# Patient Record
Sex: FEMALE | Race: WHITE | NOT HISPANIC OR LATINO | Employment: OTHER | URBAN - NONMETROPOLITAN AREA
[De-identification: names, ages, dates, MRNs, and addresses within clinical notes are randomized per-mention and may not be internally consistent; named-entity substitution may affect disease eponyms.]

---

## 2017-11-20 ENCOUNTER — PROCEDURE VISIT (OUTPATIENT)
Dept: OBSTETRICS AND GYNECOLOGY | Facility: CLINIC | Age: 57
End: 2017-11-20

## 2017-11-20 VITALS
BODY MASS INDEX: 24.86 KG/M2 | WEIGHT: 164 LBS | DIASTOLIC BLOOD PRESSURE: 89 MMHG | SYSTOLIC BLOOD PRESSURE: 141 MMHG | HEIGHT: 68 IN

## 2017-11-20 DIAGNOSIS — N95.1 MENOPAUSAL STATE: ICD-10-CM

## 2017-11-20 DIAGNOSIS — Z12.31 ENCOUNTER FOR SCREENING MAMMOGRAM FOR BREAST CANCER: ICD-10-CM

## 2017-11-20 DIAGNOSIS — Z01.419 ENCOUNTER FOR GYNECOLOGICAL EXAMINATION WITHOUT ABNORMAL FINDING: Primary | ICD-10-CM

## 2017-11-20 DIAGNOSIS — Z90.710 H/O HYSTERECTOMY FOR BENIGN DISEASE: ICD-10-CM

## 2017-11-20 PROCEDURE — 99396 PREV VISIT EST AGE 40-64: CPT | Performed by: NURSE PRACTITIONER

## 2017-11-20 RX ORDER — UBIDECARENONE 75 MG
50 CAPSULE ORAL DAILY
COMMUNITY

## 2017-11-20 NOTE — PROGRESS NOTES
Subjective   Jessica Allison is a 57 y.o.  here for annual exam and mammogram. No complaints at this time.    Gynecologic Exam   The patient's pertinent negatives include no genital itching, genital lesions, genital odor, genital rash, pelvic pain, vaginal bleeding or vaginal discharge. Pertinent negatives include no abdominal pain, dysuria, headaches, nausea, rash or vomiting. She is sexually active. No, her partner does not have an STD. She uses hysterectomy for contraception. She is postmenopausal.     Hx of RSO in  for ovarian torsion and TLH/LSO in  for symptomatic fibroids.  Last pap- 10/12/10 normal  Last mammo- 10/18/16 normal    The following portions of the patient's history were reviewed and updated as appropriate: allergies, current medications, past family history, past medical history, past social history, past surgical history and problem list.    Review of Systems   Constitutional: Negative for activity change, appetite change, fatigue and unexpected weight change.   Respiratory: Negative for chest tightness and shortness of breath.    Cardiovascular: Negative for chest pain, palpitations and leg swelling.   Gastrointestinal: Negative for abdominal distention, abdominal pain, nausea and vomiting.   Genitourinary: Negative for difficulty urinating, dyspareunia, dysuria, pelvic pain, vaginal bleeding, vaginal discharge and vaginal pain.   Musculoskeletal: Negative for gait problem and myalgias.   Skin: Negative for color change, pallor and rash.   Neurological: Negative for dizziness, weakness, light-headedness and headaches.   Hematological: Negative for adenopathy.   Psychiatric/Behavioral: Negative for agitation, dysphoric mood and sleep disturbance. The patient is not nervous/anxious.        Objective   Physical Exam   Constitutional: She is oriented to person, place, and time. She appears well-developed and well-nourished.   Cardiovascular: Normal rate, regular rhythm, normal  heart sounds and intact distal pulses.    Pulmonary/Chest: Effort normal and breath sounds normal. Right breast exhibits no inverted nipple, no mass, no nipple discharge, no skin change and no tenderness. Left breast exhibits no inverted nipple, no mass, no nipple discharge, no skin change and no tenderness. Breasts are symmetrical. There is no breast swelling.   Genitourinary: No breast tenderness. There is no rash, tenderness, lesion or injury on the right labia. There is no rash, tenderness, lesion or injury on the left labia. There is erythema in the vagina. No tenderness or bleeding in the vagina. No foreign body in the vagina. No signs of injury around the vagina. No vaginal discharge found.   Genitourinary Comments: Cervix, uterus and adnexa absent. Postmenopausal vaginal atrophy noted. Pap smear not indicated.   Lymphadenopathy:     She has no axillary adenopathy.        Right: No inguinal adenopathy present.        Left: No inguinal adenopathy present.   Neurological: She is alert and oriented to person, place, and time.   Skin: Skin is warm, dry and intact.   Psychiatric: She has a normal mood and affect. Her behavior is normal.   Nursing note and vitals reviewed.        Assessment/Plan   Jessica was seen today for gynecologic exam.    Diagnoses and all orders for this visit:    Encounter for gynecological examination without abnormal finding    Encounter for screening mammogram for breast cancer  -     Mammo Screening Digital Tomosynthesis Bilateral With CAD    H/O hysterectomy for benign disease    Menopausal state

## 2018-10-22 DIAGNOSIS — Z12.39 SCREENING FOR MALIGNANT NEOPLASM OF BREAST: Primary | ICD-10-CM

## 2019-11-15 RX ORDER — LEVOTHYROXINE SODIUM 0.03 MG/1
25 TABLET ORAL DAILY
COMMUNITY

## 2019-11-20 ENCOUNTER — HOSPITAL ENCOUNTER (OUTPATIENT)
Facility: HOSPITAL | Age: 59
Setting detail: HOSPITAL OUTPATIENT SURGERY
Discharge: HOME | End: 2019-11-20
Attending: INTERNAL MEDICINE | Admitting: INTERNAL MEDICINE

## 2019-11-20 ENCOUNTER — ANESTHESIA EVENT (OUTPATIENT)
Dept: GASTROENTEROLOGY | Facility: HOSPITAL | Age: 59
End: 2019-11-20

## 2019-11-20 ENCOUNTER — ANESTHESIA (OUTPATIENT)
Dept: GASTROENTEROLOGY | Facility: HOSPITAL | Age: 59
End: 2019-11-20

## 2019-11-20 VITALS
RESPIRATION RATE: 18 BRPM | HEART RATE: 71 BPM | HEIGHT: 69 IN | DIASTOLIC BLOOD PRESSURE: 71 MMHG | TEMPERATURE: 98 F | SYSTOLIC BLOOD PRESSURE: 118 MMHG | BODY MASS INDEX: 23.55 KG/M2 | OXYGEN SATURATION: 93 % | WEIGHT: 159 LBS

## 2019-11-20 DIAGNOSIS — Z12.11 SCREEN FOR COLON CANCER: ICD-10-CM

## 2019-11-20 PROCEDURE — 88305 TISSUE EXAM BY PATHOLOGIST: CPT | Performed by: PATHOLOGY

## 2019-11-20 PROCEDURE — 25010000002 PROPOFOL 10 MG/ML EMULSION: Performed by: NURSE ANESTHETIST, CERTIFIED REGISTERED

## 2019-11-20 PROCEDURE — 88305 TISSUE EXAM BY PATHOLOGIST: CPT | Performed by: INTERNAL MEDICINE

## 2019-11-20 RX ORDER — DEXTROSE AND SODIUM CHLORIDE 5; .45 G/100ML; G/100ML
30 INJECTION, SOLUTION INTRAVENOUS CONTINUOUS PRN
Status: DISCONTINUED | OUTPATIENT
Start: 2019-11-20 | End: 2019-11-20 | Stop reason: HOSPADM

## 2019-11-20 RX ORDER — PROPOFOL 10 MG/ML
VIAL (ML) INTRAVENOUS AS NEEDED
Status: DISCONTINUED | OUTPATIENT
Start: 2019-11-20 | End: 2019-11-20 | Stop reason: SURG

## 2019-11-20 RX ORDER — LIDOCAINE HYDROCHLORIDE 20 MG/ML
INJECTION, SOLUTION EPIDURAL; INFILTRATION; INTRACAUDAL; PERINEURAL AS NEEDED
Status: DISCONTINUED | OUTPATIENT
Start: 2019-11-20 | End: 2019-11-20 | Stop reason: SURG

## 2019-11-20 RX ADMIN — PROPOFOL 20 MG: 10 INJECTION, EMULSION INTRAVENOUS at 13:43

## 2019-11-20 RX ADMIN — PROPOFOL 30 MG: 10 INJECTION, EMULSION INTRAVENOUS at 13:39

## 2019-11-20 RX ADMIN — PROPOFOL 30 MG: 10 INJECTION, EMULSION INTRAVENOUS at 13:45

## 2019-11-20 RX ADMIN — PROPOFOL 20 MG: 10 INJECTION, EMULSION INTRAVENOUS at 13:47

## 2019-11-20 RX ADMIN — PROPOFOL 20 MG: 10 INJECTION, EMULSION INTRAVENOUS at 13:40

## 2019-11-20 RX ADMIN — LIDOCAINE HYDROCHLORIDE 100 MG: 20 INJECTION, SOLUTION EPIDURAL; INFILTRATION; INTRACAUDAL at 13:35

## 2019-11-20 RX ADMIN — PROPOFOL 20 MG: 10 INJECTION, EMULSION INTRAVENOUS at 13:37

## 2019-11-20 RX ADMIN — DEXTROSE AND SODIUM CHLORIDE 30 ML/HR: 5; 450 INJECTION, SOLUTION INTRAVENOUS at 12:58

## 2019-11-20 RX ADMIN — PROPOFOL 30 MG: 10 INJECTION, EMULSION INTRAVENOUS at 13:38

## 2019-11-20 RX ADMIN — PROPOFOL 30 MG: 10 INJECTION, EMULSION INTRAVENOUS at 13:42

## 2019-11-20 RX ADMIN — PROPOFOL 100 MG: 10 INJECTION, EMULSION INTRAVENOUS at 13:35

## 2019-11-20 NOTE — ANESTHESIA POSTPROCEDURE EVALUATION
Patient: Jessica Allison    Procedure Summary     Date:  11/20/19 Room / Location:  [facility] ENDOSCOPY 2 / [facility]    Anesthesia Start:  1333 Anesthesia Stop:  1354    Procedure:  COLONOSCOPY (N/A ) Diagnosis:       Screen for colon cancer      (Screen for colon cancer [Z12.11])    Surgeon:  Jono Palafox DO Provider:  Sosa Harvey CRNA    Anesthesia Type:  MAC ASA Status:  2          Anesthesia Type: MAC  Last vitals  BP       Temp   98.3 °F (36.8 °C) (11/20/19 1245)   Pulse   81 (11/20/19 1242)   Resp   18 (11/20/19 1242)     SpO2   100 % (11/20/19 1242)     Post Anesthesia Care and Evaluation    Patient location during evaluation: bedside  Patient participation: waiting for patient participation  Level of consciousness: sleepy but conscious  Pain score: 0  Pain management: adequate  Airway patency: patent  Anesthetic complications: No anesthetic complications  PONV Status: none  Cardiovascular status: acceptable  Respiratory status: acceptable  Hydration status: acceptable

## 2019-11-20 NOTE — ANESTHESIA PREPROCEDURE EVALUATION
Anesthesia Evaluation     Patient summary reviewed and Nursing notes reviewed                Airway   Mallampati: II  TM distance: >3 FB  Neck ROM: full  No difficulty expected  Dental - normal exam     Pulmonary - negative pulmonary ROS and normal exam   Cardiovascular - normal exam    (+) hypertension well controlled less than 2 medications,       Neuro/Psych- negative ROS  GI/Hepatic/Renal/Endo    (+)   thyroid problem hypothyroidism    Musculoskeletal (-) negative ROS    Abdominal  - normal exam   Substance History - negative use     OB/GYN negative ob/gyn ROS         Other                        Anesthesia Plan    ASA 2     MAC     intravenous induction     Anesthetic plan, all risks, benefits, and alternatives have been provided, discussed and informed consent has been obtained with: patient.

## 2019-11-21 LAB
LAB AP CASE REPORT: NORMAL
PATH REPORT.FINAL DX SPEC: NORMAL
PATH REPORT.GROSS SPEC: NORMAL

## 2019-12-17 ENCOUNTER — PROCEDURE VISIT (OUTPATIENT)
Dept: OBSTETRICS AND GYNECOLOGY | Facility: CLINIC | Age: 59
End: 2019-12-17

## 2019-12-17 VITALS
SYSTOLIC BLOOD PRESSURE: 110 MMHG | DIASTOLIC BLOOD PRESSURE: 70 MMHG | BODY MASS INDEX: 23.85 KG/M2 | HEIGHT: 69 IN | WEIGHT: 161 LBS

## 2019-12-17 DIAGNOSIS — Z78.0 OSTEOPENIA AFTER MENOPAUSE: ICD-10-CM

## 2019-12-17 DIAGNOSIS — Z01.419 ENCOUNTER FOR GYNECOLOGICAL EXAMINATION WITHOUT ABNORMAL FINDING: Primary | ICD-10-CM

## 2019-12-17 DIAGNOSIS — Z12.31 ENCOUNTER FOR SCREENING MAMMOGRAM FOR MALIGNANT NEOPLASM OF BREAST: ICD-10-CM

## 2019-12-17 DIAGNOSIS — M85.80 OSTEOPENIA AFTER MENOPAUSE: ICD-10-CM

## 2019-12-17 PROCEDURE — 99396 PREV VISIT EST AGE 40-64: CPT | Performed by: NURSE PRACTITIONER

## 2019-12-17 NOTE — PROGRESS NOTES
Subjective   Jessica Allison is a 59 y.o. here for GYN Exam and mammogram. No concerns at this time.     Hx of TLH/LSO in 2005 for symptomatic fibroids and RSO in 1980 for ovarian torsion.  Last pap- 10/12/10 NIL; no hx of abnormal  Last mammo- 11/26/18 normal    Gynecologic Exam   The patient's pertinent negatives include no genital itching, genital lesions, genital odor, genital rash, pelvic pain, vaginal bleeding or vaginal discharge. Pertinent negatives include no abdominal pain, constipation, diarrhea or dysuria. She is sexually active. No, her partner does not have an STD. She uses hysterectomy for contraception. She is postmenopausal.       The following portions of the patient's history were reviewed and updated as appropriate: allergies, current medications, past family history, past medical history, past social history, past surgical history and problem list.    Review of Systems   Constitutional: Negative for activity change, appetite change, diaphoresis, fatigue, unexpected weight gain and unexpected weight loss.   Respiratory: Negative for chest tightness and shortness of breath.    Cardiovascular: Negative for chest pain and palpitations.   Gastrointestinal: Negative for abdominal distention, abdominal pain, constipation and diarrhea.   Endocrine: Negative.    Genitourinary: Negative for breast discharge, breast lump, breast pain, dyspareunia, dysuria, pelvic pain, pelvic pressure, urinary incontinence, vaginal bleeding, vaginal discharge and vaginal pain.   Musculoskeletal: Negative for myalgias.   Skin: Negative for color change, dry skin and skin lesions.   Neurological: Negative for light-headedness and headache.   Psychiatric/Behavioral: Negative for agitation, dysphoric mood, sleep disturbance, depressed mood and stress. The patient is not nervous/anxious.        Objective   Physical Exam   Constitutional: She is oriented to person, place, and time. She appears well-developed and  well-nourished. No distress.   Neck: No thyroid mass and no thyromegaly present.   Cardiovascular: Normal rate, regular rhythm, normal heart sounds and intact distal pulses.   Pulmonary/Chest: Effort normal and breath sounds normal. Right breast exhibits no inverted nipple, no mass, no nipple discharge, no skin change and no tenderness. Left breast exhibits no inverted nipple, no mass, no nipple discharge, no skin change and no tenderness. No breast swelling or bleeding. Breasts are symmetrical.   Abdominal: Soft. Bowel sounds are normal. She exhibits no distension. There is no tenderness.   Genitourinary: No breast swelling or bleeding.   Genitourinary Comments: Uterus, cervix and adnexa absent. Well healed vaginal cuff. Pap not indicated.   Lymphadenopathy:     She has no axillary adenopathy.        Right: No inguinal adenopathy present.        Left: No inguinal adenopathy present.   Neurological: She is alert and oriented to person, place, and time.   Skin: Skin is warm, dry and intact. She is not diaphoretic.   Psychiatric: She has a normal mood and affect. Her behavior is normal.   Nursing note and vitals reviewed.        Assessment/Plan   Jessica was seen today for gynecologic exam.    Diagnoses and all orders for this visit:    Encounter for gynecological examination without abnormal finding    Encounter for screening mammogram for malignant neoplasm of breast  -     Mammo Screening Digital Tomosynthesis Bilateral With CAD    Osteopenia after menopause  -     DEXA Bone Density Axial; Future    Mammogram and DEXA today. F/U in 1 year for pelvic exam or sooner, PRN.

## 2019-12-18 ENCOUNTER — TELEPHONE (OUTPATIENT)
Dept: OBSTETRICS AND GYNECOLOGY | Facility: CLINIC | Age: 59
End: 2019-12-18

## 2019-12-18 NOTE — TELEPHONE ENCOUNTER
----- Message from GARRET Marquez sent at 12/18/2019  8:55 AM CST -----  Still has osteopenia but that has improved since the last scan in 2015. Continue calcium and vitamin D supplementation as well as exercise. Next due in 2 years.

## 2020-11-05 ENCOUNTER — TRANSCRIBE ORDERS (OUTPATIENT)
Dept: PHYSICAL THERAPY | Facility: CLINIC | Age: 60
End: 2020-11-05

## 2020-11-05 DIAGNOSIS — G89.29 CHRONIC RIGHT SHOULDER PAIN: ICD-10-CM

## 2020-11-05 DIAGNOSIS — M50.30 DEGENERATION OF INTERVERTEBRAL DISC OF CERVICAL REGION: Primary | ICD-10-CM

## 2020-11-05 DIAGNOSIS — M25.511 CHRONIC RIGHT SHOULDER PAIN: ICD-10-CM

## 2020-11-09 ENCOUNTER — TREATMENT (OUTPATIENT)
Dept: PHYSICAL THERAPY | Facility: CLINIC | Age: 60
End: 2020-11-09

## 2020-11-09 DIAGNOSIS — M50.30 DDD (DEGENERATIVE DISC DISEASE), CERVICAL: Primary | ICD-10-CM

## 2020-11-09 DIAGNOSIS — M75.41 SHOULDER IMPINGEMENT, RIGHT: ICD-10-CM

## 2020-11-09 PROCEDURE — 97161 PT EVAL LOW COMPLEX 20 MIN: CPT | Performed by: PHYSICAL THERAPIST

## 2020-11-09 PROCEDURE — 97140 MANUAL THERAPY 1/> REGIONS: CPT | Performed by: PHYSICAL THERAPIST

## 2020-11-09 PROCEDURE — 97110 THERAPEUTIC EXERCISES: CPT | Performed by: PHYSICAL THERAPIST
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
## 2020-11-16 ENCOUNTER — TREATMENT (OUTPATIENT)
Dept: PHYSICAL THERAPY | Facility: CLINIC | Age: 60
End: 2020-11-16

## 2020-11-16 DIAGNOSIS — M75.41 SHOULDER IMPINGEMENT, RIGHT: ICD-10-CM

## 2020-11-16 DIAGNOSIS — M50.30 DDD (DEGENERATIVE DISC DISEASE), CERVICAL: Primary | ICD-10-CM

## 2020-11-16 PROCEDURE — 97110 THERAPEUTIC EXERCISES: CPT | Performed by: PHYSICAL THERAPIST

## 2020-11-16 PROCEDURE — 97140 MANUAL THERAPY 1/> REGIONS: CPT | Performed by: PHYSICAL THERAPIST

## 2020-11-16 NOTE — PROGRESS NOTES
Physical Therapy Daily Progress Note      Patient: Jessica Allison   : 1960  Referring practitioner: Deniz Peterson MD  Date of Initial Visit: Type: THERAPY  Noted: 2020  Today's Date: 2020  Patient seen for 2 sessions  Recert 12/ 3       Jessica Allison reports: Neck is hurting today, was improved following last visit  Subjective Questionnaire:       Subjective     Objective   See Exercise, Manual, and Modality Logs for complete treatment.   Rounded shoulder forward head slumped posture position at rest    Assessment/Plan  Plan Goals: 1.  Cervical flexion ROM  50   Degrees  2.  Cervical extension ROM  45 Degrees  3.  Cervical side bend 25 patricia  Degrees  4.  Cervical rotation left 78   Degrees.    LT. Independent in HEP.  2. Decrease NDI to 30%.  3. Maintain upper cervical alignment  4. Negative impigement sign  5. MMT shoulder flexion   Visit Diagnoses:    ICD-10-CM ICD-9-CM   1. DDD (degenerative disc disease), cervical  M50.30 722.4   2. Shoulder impingement, right  M75.41 726.2       Progress per Plan of Care and Progress strengthening /stabilization /functional activity           Timed:  Manual Therapy:   12      mins  50223;  Therapeutic Exercise:     35    mins  95003;     Neuromuscular Scotty:        mins  73863;    Therapeutic Activity:          mins  53438;     Gait Training:           mins  62893;     Ultrasound:          mins  51881;    Electrical Stimulation:         mins  15432 ( );    Untimed:  Electrical Stimulation:         mins  98317 ( );  Mechanical Traction:         mins  63327;     Timed Treatment:      mins   Total Treatment:     47   mins  Chacha Baldwin PT DPT  Physical Therapist

## 2020-11-24 ENCOUNTER — TREATMENT (OUTPATIENT)
Dept: PHYSICAL THERAPY | Facility: CLINIC | Age: 60
End: 2020-11-24

## 2020-11-24 DIAGNOSIS — M50.30 DDD (DEGENERATIVE DISC DISEASE), CERVICAL: Primary | ICD-10-CM

## 2020-11-24 DIAGNOSIS — M75.41 SHOULDER IMPINGEMENT, RIGHT: ICD-10-CM

## 2020-11-24 PROCEDURE — 97110 THERAPEUTIC EXERCISES: CPT | Performed by: PHYSICAL THERAPIST

## 2020-11-24 PROCEDURE — 97140 MANUAL THERAPY 1/> REGIONS: CPT | Performed by: PHYSICAL THERAPIST

## 2020-11-24 NOTE — PROGRESS NOTES
Physical Therapy Daily Progress Note      Patient: Jessica Allison   : 1960  Referring practitioner: Deniz Peterson MD  Date of Initial Visit: Type: THERAPY  Noted: 2020  Today's Date: 2020  Patient seen for 3 sessions    Recheck:   MD: MOR Allison reports:  No change yet        Subjective Evaluation    History of Present Illness    Subjective comment: pt states that the stretches and home exercises makes her neck very sore.Pain  Current pain ratin           Objective          Postural Observations    Additional Postural Observation Details  Good postural awareness throughout.      See Exercise, Manual, and Modality Logs for complete treatment.       Assessment & Plan     Assessment  Assessment details: Pt felt that she was overdoing it with HEP so showed her alternate/more gentle ways to stretch and pt responded well to that. Pt did feel better post manual to neck. Shoulder was tight with GH mobs. Inferior mobs bothered her neck some. Added no moneys to HEP and pt verbalized understanding of more gentle stretching at home.    Goals  Plan Goals: Plan Goals: 1.  Cervical flexion ROM  50   Degrees  2.  Cervical extension ROM  45 Degrees  3.  Cervical side bend 25 patricia  Degrees  4.  Cervical rotation left 78   Degrees.    LT. Independent in HEP.  2. Decrease NDI to 30%.  3. Maintain upper cervical alignment  4. Negative impigement sign  5. MMT shoulder flexion 5/5      Plan  Duration in visits: 12  Plan details: Cont manual. Try shoulder blade mobes next. Scap stab therex.        Visit Diagnoses:    ICD-10-CM ICD-9-CM   1. DDD (degenerative disc disease), cervical  M50.30 722.4   2. Shoulder impingement, right  M75.41 726.2       Progress per Plan of Care and Progress strengthening /stabilization /functional activity           Timed:  Manual Therapy:    15     mins  81075;  Therapeutic Exercise:    30     mins  25583;     Neuromuscular Scotty:        mins  35261;     Therapeutic Activity:          mins  50608;     Gait Training:           mins  44118;     Ultrasound:          mins  03252;    Electrical Stimulation:         mins  24087 ( );    Untimed:  Electrical Stimulation:         mins  36809 ( );  Mechanical Traction:         mins  06902;     Timed Treatment:   45   mins   Total Treatment:     45   mins  [facility]  Physical Therapist

## 2020-12-01 ENCOUNTER — TREATMENT (OUTPATIENT)
Dept: PHYSICAL THERAPY | Facility: CLINIC | Age: 60
End: 2020-12-01

## 2020-12-01 DIAGNOSIS — M75.41 SHOULDER IMPINGEMENT, RIGHT: ICD-10-CM

## 2020-12-01 DIAGNOSIS — M50.30 DDD (DEGENERATIVE DISC DISEASE), CERVICAL: Primary | ICD-10-CM

## 2020-12-01 PROCEDURE — 97110 THERAPEUTIC EXERCISES: CPT | Performed by: PHYSICAL THERAPIST

## 2020-12-01 PROCEDURE — 97140 MANUAL THERAPY 1/> REGIONS: CPT | Performed by: PHYSICAL THERAPIST

## 2020-12-01 NOTE — PROGRESS NOTES
"Re-Assessment / Re-Certification      Patient: Jessica Allison   : 1960  Diagnosis/ICD-10 Code:  DDD (degenerative disc disease), cervical [M50.30]  Referring practitioner: Deniz Peterson MD  Date of Initial Visit: Type: THERAPY  Noted: 2020  Today's Date: 2020  Patient seen for 4 sessions    Recheck due: 20  MD appt: PRN  Auth: 5 visits    Subjective:   Jessica Allison reports: \"no real change yet\"  Subjective Questionnaire:   QuickDASH: 36%  NDI: 54% (score of 22% or more is considered a significant ADL disability)  Clinical Progress: unchanged  Home Program Compliance: Yes  Treatment has included: therapeutic exercise, manual therapy and cryotherapy    Subjective Evaluation    History of Present Illness    Subjective comment: Pt states she hasn't seen much change yet. States her shoulder has been bothering her for a very long time but her neck is what bothers her the most. Doing better with her HEP after getting a review last treatment. States she has a little bit of a HA today, more tension type. Has been doing self-traction at home which does help some. Pt watches her grandchildren during the day and is always busy. Neck always hurts at the end of the dayPain  Current pain ratin         Objective          Postural Observations    Additional Postural Observation Details  Fairly good postural awareness noted, very minor cues needed throughout treatment.    Palpation   Left   Tenderness of the cervical paraspinals, levator scapulae, sternocleidomastoid, suboccipitals and upper trapezius.     Right Tenderness of the cervical paraspinals, levator scapulae, sternocleidomastoid, suboccipitals and upper trapezius.     Tenderness     Right Shoulder  Tenderness in the biceps tendon (proximal) and bicipital groove.     Additional Tenderness Details  TTP with central PA's at C3-C5; TTP at R transverse processes at these levels    Neurological Testing     Sensation   Cervical/Thoracic   Left "   Intact: light touch    Right   Intact: light touch    Active Range of Motion   Cervical/Thoracic Spine   Cervical    Flexion: 40 degrees   Extension: 40 degrees   Left lateral flexion: 40 degrees   Right lateral flexion: 30 (pain midline) degrees   Left rotation: 70 degrees   Right rotation: 65 (Pain midline) degrees     Additional Active Range of Motion Details  - Bilateral shoulder flex/abd AROM grossly 150-160 deg  - Bilateral elbow AROM WFL    Passive Range of Motion   Left Shoulder   Normal passive range of motion    Right Shoulder   Flexion: 160 degrees   Abduction: 150 degrees   External rotation 45°: 50 degrees   Internal rotation 45°: 66 degrees     Strength/Myotome Testing     Left Shoulder     Planes of Motion   Flexion: 5   Abduction: 5   External rotation at 0°: 5   Internal rotation at 0°: 5     Right Shoulder     Planes of Motion   Flexion: 4+ (pain anterolateral shoulder)   Abduction: 4 (pain anterolateral shoulder)     Left Elbow   Flexion: 5  Extension: 5    Right Elbow   Flexion: 5  Extension: 5    Left Wrist/Hand   Wrist extension: 5  Wrist flexion: 5    Right Wrist/Hand   Wrist extension: 5  Wrist flexion: 5    Tests   Cervical     Left   Negative cervical distraction and Spurling's sign.     Right   Negative cervical distraction and Spurling's sign.     Right Shoulder   Positive Hawkin's and painful arc.   Negative drop arm, empty can and full can.       Assessment & Plan     Assessment  Impairments: abnormal or restricted ROM, activity intolerance, impaired physical strength, lacks appropriate home exercise program and pain with function  Assessment details: Pt is progressing slowly overall with PT at this time. Pt has demonstrated some mild improvements in overall cervical mobility but continues to be more symptomatic with R rotation and extension. Better performance with stretches this date. Did modify to stretch R side only as pt reported increased R sided neck pain with L sided  stretches. Pt does fairly well with gentle postural & scap stabilization activities with only minor cues needed. Chin tuck performance improving and she did well with addition of cervical isometrics. Pt still limited in overall stability in her cervical spine and does demonstrate muscle tightness/tenderness at paraspinals, UT, and sub-occipitals. Continues to demonstrate positive impingement signs in R shoulder but could also be having referred pain from cervical region as well. Pt continues to have increased cervical and R shoulder pain with functional activities at home. Pt will continue to benefit from skilled PT services to further improve overall cervical stability/mobility, postural stability, functional shoulder ROM/strength, and tolerance to daily activities.  Prognosis: good  Functional Limitations: lifting, sleeping, uncomfortable because of pain, reaching overhead and unable to perform repetitive tasks  Goals  Plan Goals: STG's by 12/15/20:   1.  Cervical flexion ROM  50   Degrees (partially met at 45 deg)  2.  Cervical extension ROM  45 Degrees (partially met at 40 deg)  3.  Cervical side bend 25 patricia  Degrees (met)  4.  Cervical rotation left 78   Degrees (progressing)  5) Tolerate 15-20 minutes of cervical & postural stabilization activities without increased neck/shoulder pain (new)    LTG's by 12/29/20  1. Independent in HEP. (progressing)  2. Decrease NDI to 30%. (progressing)  3. Maintain upper cervical alignment (progressing)  4. Negative impigement sign (progressing)  5. MMT shoulder flexion 5/5 (progressing)  6) Demonstrate proper posture/ awareness during treatment session with no cuing required to correct (new)      Plan  Therapy options: will be seen for skilled physical therapy services  Planned modality interventions: cryotherapy, thermotherapy (hydrocollator packs) and dry needling  Planned therapy interventions: body mechanics training, flexibility, functional ROM exercises,  home exercise program, joint mobilization, manual therapy, neuromuscular re-education, postural training, soft tissue mobilization, therapeutic activities, stretching and strengthening  Duration in visits: 8  Treatment plan discussed with: patient  Plan details: Will continue for 3 more weeks. Pt would like to drop to 1x/week due to having difficulty attending 2 due to personal scheduling issues. Continue to progress cervical and postural stabilization activities. Continue cervical isometric activities. May try standing cervical rotation with ball assist on wall. Review chin tucks. Continue manual interventions to both cervical spine and shoulder. Needs work on overall stability in the cervical spine.        Visit Diagnoses:    ICD-10-CM ICD-9-CM   1. DDD (degenerative disc disease), cervical  M50.30 722.4   2. Shoulder impingement, right  M75.41 726.2       Progress toward previous goals: Partially Met      Recommendations: Continue as planned  Timeframe: 3 weeks  Prognosis to achieve goals: fair    PT Signature: Angelica Mathis, PT      Based upon review of the patient's progress and continued therapy plan, it is my medical opinion that Jessica Allison should continue physical therapy treatment at [facility]  [address] [place] [id number] [phone].    Signature: __________________________________  Deniz Peterson MD    Timed:  Manual Therapy:    12     mins  38424;  Therapeutic Exercise:    38     mins  94006;     Neuromuscular Scotty:        mins  95334;    Therapeutic Activity:          mins  02867;     Gait Training:           mins  12576;     Ultrasound:          mins  53893;    Electrical Stimulation:         mins  19940 ( );    Untimed:  Electrical Stimulation:         mins  32272 ( );  Mechanical Traction:         mins  32082;     Timed Treatment:   50   mins   Total Treatment:     50   mins

## 2020-12-02 ENCOUNTER — TREATMENT (OUTPATIENT)
Dept: PHYSICAL THERAPY | Facility: CLINIC | Age: 60
End: 2020-12-02

## 2020-12-02 DIAGNOSIS — M50.30 DDD (DEGENERATIVE DISC DISEASE), CERVICAL: Primary | ICD-10-CM

## 2020-12-02 DIAGNOSIS — M75.41 SHOULDER IMPINGEMENT, RIGHT: ICD-10-CM

## 2020-12-02 PROCEDURE — 97110 THERAPEUTIC EXERCISES: CPT | Performed by: PHYSICAL THERAPIST

## 2020-12-02 PROCEDURE — 97140 MANUAL THERAPY 1/> REGIONS: CPT | Performed by: PHYSICAL THERAPIST

## 2020-12-02 NOTE — PROGRESS NOTES
"   Physical Therapy Daily Progress Note      Patient: Jessica Allison   : 1960  Referring practitioner: Deniz Peterson MD  Date of Initial Visit: Type: THERAPY  Noted: 2020  Today's Date: 2020  Patient seen for 5 sessions    Recheck due: 20  MD appt: PRN  Auth: 5 visits       Jessica Allison reports: \"no real change yet\"        Subjective Evaluation    History of Present Illness    Subjective comment: pt states that her neck was really sore this morning.Pain  Current pain ratin           Objective           General Comments     Shoulder Comments   R shoulder winging. R shoulder stiff with scapular mobilizations     See Exercise, Manual, and Modality Logs for complete treatment.       Assessment & Plan     Assessment  Assessment details: Pt did have some increase pain in the L shoulder today as well but thinks she may have slept wrong. Pt was challenged with cspine isometrics but after cuing did improve some. Pt responds well to cspine gentle cervical distraction and does have home traction unit. Spoke with pt about trying cervical mechanical unit next visit.     Goals  Plan Goals: STG's by 12/15/20:   1.  Cervical flexion ROM  50   Degrees (partially met at 45 deg)  2.  Cervical extension ROM  45 Degrees (partially met at 40 deg)  3.  Cervical side bend 25 patricia  Degrees (met)  4.  Cervical rotation left 78   Degrees (progressing)  5) Tolerate 15-20 minutes of cervical & postural stabilization activities without increased neck/shoulder pain (new)    LTG's by 20  1. Independent in HEP. (progressing)  2. Decrease NDI to 30%. (progressing)  3. Maintain upper cervical alignment (progressing)  4. Negative impigement sign (progressing)  5. MMT shoulder flexion 5/5 (progressing)  6) Demonstrate proper posture/ awareness during treatment session with no cuing required to correct (new)      Plan  Duration in visits: 8  Plan details: Possible wall push ups.        Visit " Diagnoses:    ICD-10-CM ICD-9-CM   1. DDD (degenerative disc disease), cervical  M50.30 722.4   2. Shoulder impingement, right  M75.41 726.2       Progress per Plan of Care and Progress strengthening /stabilization /functional activity           Timed:  Manual Therapy:    10     mins  01064;  Therapeutic Exercise:    45     mins  21634;     Neuromuscular Scotty:        mins  29359;    Therapeutic Activity:          mins  31825;     Gait Training:           mins  82946;     Ultrasound:          mins  59380;    Electrical Stimulation:         mins  51287 ( );    Untimed:  Electrical Stimulation:         mins  68704 ( );  Mechanical Traction:         mins  79817;     Timed Treatment:   55   mins   Total Treatment:     55   mins  [facility]  Physical Therapist

## 2021-09-24 ENCOUNTER — OFFICE VISIT (OUTPATIENT)
Dept: OBSTETRICS AND GYNECOLOGY | Facility: CLINIC | Age: 61
End: 2021-09-24

## 2021-09-24 VITALS
DIASTOLIC BLOOD PRESSURE: 60 MMHG | SYSTOLIC BLOOD PRESSURE: 112 MMHG | WEIGHT: 165 LBS | BODY MASS INDEX: 24.44 KG/M2 | HEIGHT: 69 IN

## 2021-09-24 DIAGNOSIS — Z12.31 ENCOUNTER FOR SCREENING MAMMOGRAM FOR MALIGNANT NEOPLASM OF BREAST: ICD-10-CM

## 2021-09-24 DIAGNOSIS — Z13.820 ENCOUNTER FOR SCREENING FOR OSTEOPOROSIS: ICD-10-CM

## 2021-09-24 DIAGNOSIS — Z78.0 OSTEOPENIA AFTER MENOPAUSE: ICD-10-CM

## 2021-09-24 DIAGNOSIS — M85.80 OSTEOPENIA AFTER MENOPAUSE: ICD-10-CM

## 2021-09-24 DIAGNOSIS — Z01.419 ENCOUNTER FOR GYNECOLOGICAL EXAMINATION WITHOUT ABNORMAL FINDING: Primary | ICD-10-CM

## 2021-09-24 PROBLEM — E03.9 ACQUIRED HYPOTHYROIDISM: Status: ACTIVE | Noted: 2020-09-25

## 2021-09-24 PROBLEM — I10 HYPERTENSIVE DISORDER: Status: ACTIVE | Noted: 2021-07-13

## 2021-09-24 PROCEDURE — 99396 PREV VISIT EST AGE 40-64: CPT | Performed by: NURSE PRACTITIONER

## 2021-09-24 RX ORDER — ATORVASTATIN CALCIUM 10 MG/1
TABLET, FILM COATED ORAL
COMMUNITY
Start: 2021-02-18

## 2021-09-24 RX ORDER — CYANOCOBALAMIN (VITAMIN B-12) 1000 MCG
TABLET ORAL
COMMUNITY
End: 2021-09-24 | Stop reason: SDUPTHER

## 2021-09-24 NOTE — PROGRESS NOTES
Subjective   Jessica Allison is a 61 y.o. presents for annual exam. No concerns at this time.    LMP- 2005 for symptomatic fibroids; TLH/LSO. 1980 RSO for torsion.    Last pap- 10/12/10; no hx of abnormal  Last mammo- 12/17/19 [facility] 1      Gynecologic Exam  The patient's pertinent negatives include no genital itching, genital lesions, genital odor, genital rash, pelvic pain, vaginal bleeding or vaginal discharge. Pertinent negatives include no abdominal pain, chills, constipation, diarrhea, dysuria, fever, frequency or urgency. She is sexually active. No, her partner does not have an STD. She uses hysterectomy for contraception. She is postmenopausal.       The following portions of the patient's history were reviewed and updated as appropriate: allergies, current medications, past family history, past medical history, past social history, past surgical history and problem list.    Review of Systems   Constitutional: Negative for activity change, appetite change, chills, diaphoresis, fatigue, fever, unexpected weight gain and unexpected weight loss.   Respiratory: Negative for chest tightness and shortness of breath.    Cardiovascular: Negative for chest pain and palpitations.   Gastrointestinal: Negative for abdominal distention, abdominal pain, constipation and diarrhea.   Endocrine: Negative.    Genitourinary: Negative for breast discharge, breast lump, breast pain, decreased libido, decreased urine volume, difficulty urinating, dyspareunia, dysuria, frequency, pelvic pain, pelvic pressure, urgency, urinary incontinence, vaginal bleeding, vaginal discharge and vaginal pain.   Musculoskeletal: Negative for myalgias.   Skin: Negative for color change, dry skin and skin lesions.   Neurological: Negative for light-headedness and headache.   Psychiatric/Behavioral: Negative for agitation, dysphoric mood, sleep disturbance, depressed mood and stress. The patient is not nervous/anxious.        Objective   Physical  Exam  Vitals and nursing note reviewed. Exam conducted with a chaperone present.   Constitutional:       General: She is awake. She is not in acute distress.     Appearance: Normal appearance. She is well-developed, well-groomed and normal weight. She is not ill-appearing, toxic-appearing or diaphoretic.   Neck:      Thyroid: No thyromegaly.   Cardiovascular:      Rate and Rhythm: Normal rate and regular rhythm.      Heart sounds: Normal heart sounds.   Pulmonary:      Effort: Pulmonary effort is normal.      Breath sounds: Normal breath sounds.   Chest:      Breasts: Easton Score is 5. Breasts are symmetrical.         Right: Normal. No swelling, bleeding, inverted nipple, mass, nipple discharge, skin change or tenderness.         Left: Normal. No swelling, bleeding, inverted nipple, mass, nipple discharge, skin change or tenderness.   Abdominal:      General: Bowel sounds are normal. There is no distension.      Palpations: Abdomen is soft.      Tenderness: There is no abdominal tenderness.   Genitourinary:     General: Normal vulva.      Exam position: Lithotomy position.      Easton stage (genital): 5.      Labia:         Right: No rash, tenderness, lesion or injury.         Left: No rash, tenderness, lesion or injury.       Urethra: No prolapse, urethral pain, urethral swelling or urethral lesion.      Vagina: Normal.      Comments: Uterus, cervix and adnexa absent. Well healed vaginal cuff. Pap not indicated.  Lymphadenopathy:      Upper Body:      Right upper body: No supraclavicular, axillary or pectoral adenopathy.      Left upper body: No supraclavicular, axillary or pectoral adenopathy.      Lower Body: No right inguinal adenopathy. No left inguinal adenopathy.   Skin:     General: Skin is warm and dry.   Neurological:      Mental Status: She is alert and oriented to person, place, and time.   Psychiatric:         Attention and Perception: Attention and perception normal.         Mood and Affect: Mood and  affect normal.         Speech: Speech normal.         Behavior: Behavior normal. Behavior is cooperative.           Assessment/Plan   Diagnoses and all orders for this visit:    1. Encounter for gynecological examination without abnormal finding (Primary)    2. Encounter for screening mammogram for malignant neoplasm of breast  -     Mammo Screening Digital Tomosynthesis Bilateral With CAD; Future  -     DEXA Bone Density Axial    3. Osteopenia after menopause  -     DEXA Bone Density Axial    4. Encounter for screening for osteoporosis    Reviewed breast cancer screening recommendations. F/U in 2 years for GYN exam or sooner, PRN. Mammogram annually.

## 2022-11-23 ENCOUNTER — TELEPHONE (OUTPATIENT)
Dept: OBSTETRICS AND GYNECOLOGY | Facility: CLINIC | Age: 62
End: 2022-11-23

## 2022-11-23 DIAGNOSIS — Z12.31 ENCOUNTER FOR SCREENING MAMMOGRAM FOR MALIGNANT NEOPLASM OF BREAST: Primary | ICD-10-CM

## 2024-01-10 ENCOUNTER — APPOINTMENT (OUTPATIENT)
Dept: OTHER | Facility: HOSPITAL | Age: 64
End: 2024-01-10
Payer: COMMERCIAL

## 2024-01-10 ENCOUNTER — OFFICE VISIT (OUTPATIENT)
Dept: NEUROSURGERY | Facility: CLINIC | Age: 64
End: 2024-01-10
Payer: COMMERCIAL

## 2024-01-10 ENCOUNTER — HOSPITAL ENCOUNTER (OUTPATIENT)
Dept: GENERAL RADIOLOGY | Facility: HOSPITAL | Age: 64
Discharge: HOME | End: 2024-01-10
Payer: COMMERCIAL

## 2024-01-10 VITALS — WEIGHT: 156 LBS | HEIGHT: 69 IN | BODY MASS INDEX: 23.11 KG/M2

## 2024-01-10 DIAGNOSIS — M54.12 CERVICAL RADICULOPATHY: ICD-10-CM

## 2024-01-10 DIAGNOSIS — R20.0 LEFT ARM NUMBNESS: ICD-10-CM

## 2024-01-10 DIAGNOSIS — M54.2 CERVICALGIA: ICD-10-CM

## 2024-01-10 DIAGNOSIS — Z78.9 NONSMOKER: ICD-10-CM

## 2024-01-10 DIAGNOSIS — M50.30 DEGENERATION OF CERVICAL INTERVERTEBRAL DISC: ICD-10-CM

## 2024-01-10 PROCEDURE — 72040 X-RAY EXAM NECK SPINE 2-3 VW: CPT

## 2024-01-10 RX ORDER — TRETINOIN 0.5 MG/G
CREAM TOPICAL AS NEEDED
COMMUNITY
Start: 2023-11-13

## 2024-01-10 RX ORDER — GABAPENTIN 100 MG/1
100 CAPSULE ORAL 3 TIMES DAILY
Qty: 90 CAPSULE | Refills: 2 | Status: SHIPPED | OUTPATIENT
Start: 2024-01-10

## 2024-01-10 RX ORDER — DICLOFENAC SODIUM 75 MG/1
75 TABLET, DELAYED RELEASE ORAL 2 TIMES DAILY
Qty: 60 TABLET | Refills: 2 | Status: SHIPPED | OUTPATIENT
Start: 2024-01-10

## 2024-01-10 RX ORDER — LEVOTHYROXINE SODIUM 0.05 MG/1
50 TABLET ORAL DAILY
COMMUNITY
Start: 2023-09-18

## 2024-01-10 RX ORDER — LOSARTAN POTASSIUM 50 MG/1
50 TABLET ORAL 2 TIMES DAILY
COMMUNITY

## 2024-01-10 NOTE — PATIENT INSTRUCTIONS
Advance Care Planning and Advance Directives     You make decisions on a daily basis - decisions about where you want to live, your career, your home, your life. Perhaps one of the most important decisions you face is your choice for future medical care. Take time to talk with your family and your healthcare team and start planning today.  Advance Care Planning is a process that can help you:  Understand possible future healthcare decisions in light of your own experiences  Reflect on those decision in light of your goals and values  Discuss your decisions with those closest to you and the healthcare professionals that care for you  Make a plan by creating a document that reflects your wishes    Surrogate Decision Maker  In the event of a medical emergency, which has left you unable to communicate or to make your own decisions, you would need someone to make decisions for you.  It is important to discuss your preferences for medical treatment with this person while you are in good health.     Qualities of a surrogate decision maker:  Willing to take on this role and responsibility  Knows what you want for future medical care  Willing to follow your wishes even if they don't agree with them  Able to make difficult medical decisions under stressful circumstances    Advance Directives  These are legal documents you can create that will guide your healthcare team and decision maker(s) when needed. These documents can be stored in the electronic medical record.    Living Will - a legal document to guide your care if you have a terminal condition or a serious illness and are unable to communicate. States vary by statute in document names/types, but most forms may include one or more of the following:        -  Directions regarding life-prolonging treatments        -  Directions regarding artificially provided nutrition/hydration        -  Choosing a healthcare decision maker        -  Direction regarding organ/tissue  donation    Durable Power of  for Healthcare - this document names an -in-fact to make medical decisions for you, but it may also allow this person to make personal and financial decisions for you. Please seek the advice of an  if you need this type of document.    **Advance Directives are not required and no one may discriminate against you if you do not sign one.    Medical Orders  Many states allow specific forms/orders signed by your physician to record your wishes for medical treatment in your current state of health. This form, signed in personal communication with your physician, addresses resuscitation and other medical interventions that you may or may not want.      For more information or to schedule a time with a [facility] Advance Care Planning Facilitator contact: [email or web address] or call [phone] and someone will contact you directly.

## 2024-01-10 NOTE — PROGRESS NOTES
Chief complaint:   Chief Complaint   Patient presents with    Neck Pain     Pt here for constant neck pain with numbness in L elbow and hand. Pt states she has not had any physical therapy or seen pain mgmt. Pt states she has seen a chiropractor last visit July,2023.       Subjective     HPI: This is a 63-year-old female patient who was referred to us by Dr. Deniz Peterson for neck pain and left upper extremity numbness.  She is here to be evaluated today.  The patient said that she did deal with neck issues for a few years.  She did have a fall in October related to a syncopal episode that they feel was related to her blood pressure and did feel like her symptoms did get a little worse after the fall.  Currently the pain in her neck is constant.  It is worse with certain positions.  It is better with ice.  She has pain that goes into her scapular area to her shoulder that is intermittent.  Nothing in particular makes it better or worse.  She does have constant numbness and tingling from her elbow down to the last 2 digits of her hand.  Denies any bowel or bladder incontinence.  She has not done any physical therapy or pain management injections.  She is attempted chiropractic care without any lasting improvement.  Denies any tobacco or illicit drug use but she does drink alcohol occasionally.  She is right-hand dominant.  She is a retired .    Review of Systems   Musculoskeletal:  Positive for myalgias and neck pain.   Neurological:  Positive for weakness and numbness.   All other systems reviewed and are negative.       Past Medical History:   Diagnosis Date    Arthritis 2012    Cervical disc disorder 2000    CTS (carpal tunnel syndrome) 2012    Disease of thyroid gland     Encounter for gynecological examination     Gynecologic examination       Headache 1990    Hx of mammogram     Other screening mammogram     Hyperlipidemia 2018    Hypertension     Low back pain 1997    Lumbosacral disc disease  "1997    Screening for osteoporosis      Past Surgical History:   Procedure Laterality Date    BACK SURGERY      CARPAL TUNNEL RELEASE  2013    COLONOSCOPY N/A 11/20/2019    Procedure: COLONOSCOPY;  Surgeon: Jono Palafox DO;  Location: [facility] ENDOSCOPY;  Service: Gastroenterology    EPIDURAL BLOCK  2000    INJECTION OF MEDICATION  07/18/2011    Kenalog    LAPAROSCOPIC TOTAL HYSTERECTOMY  2005    Laparoscopic total hysterectomylumb: TLH LSO secondary to symptomatic fibroids, menorrhagia, pelvic pain    LUMBAR DISC SURGERY  1999    L5 discectomy    OOPHORECTOMY  1980    right secondary to torsion    PAP SMEAR  10/21/2010    negative    VAGINAL DELIVERY      x 2     Family History   Problem Relation Age of Onset    Hypertension Mother     Cataracts Mother     Arthritis Mother     Stroke Mother     Prostate cancer Father     Hypertension Father     Stroke Father     Cancer Sister     Thyroid disease Sister     Cancer Brother         Cancer - other:  Brother    Hypertension Brother     Colon cancer Maternal Grandmother         MGM had colon cancer    Breast cancer Paternal Grandmother         PGM had breast cancer.    Thyroid cancer Other      Social History     Tobacco Use    Smoking status: Never    Smokeless tobacco: Never   Vaping Use    Vaping Use: Never used   Substance Use Topics    Alcohol use: Yes     Alcohol/week: 2.0 standard drinks of alcohol     Types: 2 Drinks containing 0.5 oz of alcohol per week     Comment: occasional use, socially    Drug use: No     (Not in a hospital admission)    Allergies:  Patient has no known allergies.    Objective      Vital Signs  Ht 175.3 cm (69\")   Wt 70.8 kg (156 lb)   LMP 01/01/2005   BMI 23.04 kg/m²     Physical Exam  Constitutional:       Appearance: Normal appearance. She is well-developed.   HENT:      Head: Normocephalic.   Eyes:      General: Lids are normal.      Extraocular Movements: EOM normal.      Conjunctiva/sclera: Conjunctivae normal.      Pupils: " Pupils are equal, round, and reactive to light.   Pulmonary:      Effort: Pulmonary effort is normal.      Breath sounds: Normal breath sounds.   Musculoskeletal:         General: Normal range of motion.      Cervical back: Normal range of motion.   Skin:     General: Skin is warm.   Neurological:      Mental Status: She is alert and oriented to person, place, and time.      GCS: GCS eye subscore is 4. GCS verbal subscore is 5. GCS motor subscore is 6.      Cranial Nerves: No cranial nerve deficit.      Sensory: No sensory deficit.      Motor: Motor strength is normal.     Gait: Gait is intact.      Deep Tendon Reflexes: Reflexes are normal and symmetric. Reflexes normal.   Psychiatric:         Speech: Speech normal.         Behavior: Behavior normal.         Thought Content: Thought content normal.         Neurologic Exam     Mental Status   Oriented to person, place, and time.   Attention: normal. Concentration: normal.   Speech: speech is normal   Level of consciousness: alert  Normal comprehension.     Cranial Nerves     CN II   Visual fields full to confrontation.     CN III, IV, VI   Pupils are equal, round, and reactive to light.  Extraocular motions are normal.     CN V   Facial sensation intact.     CN VII   Facial expression full, symmetric.     CN VIII   CN VIII normal.     CN IX, X   CN IX normal.   CN X normal.     CN XI   CN XI normal.     CN XII   CN XII normal.     Motor Exam   Muscle bulk: normal    Strength   Strength 5/5 throughout.     Sensory Exam   Light touch normal.     Gait, Coordination, and Reflexes     Gait  Gait: normal    Reflexes   Reflexes 2+ except as noted.       Imaging review: X-ray of the cervical spine that was done on October 20, 2023 shows disc degeneration at C3-4, 4-5.  Slight retrolisthesis noted at C5-6.        Assessment/Plan: The patient does have some disc degeneration.  I am going to have her go for x-rays of the cervical spine with flexion and extension.  I will also  start her on diclofenac and gabapentin to try and help with her pain issues.  For first line conservative care of cervical pain, I would like to send Ms. Allison for a dedicated course of physician directed physical therapy consisting of 2-3 times a week for 4-6 weeks.    Return for reassessment with me after 6-8 weeks after physical therapy.     Should Ms. Allison not have any improvement from physical therapy, I think it would be prudent to send the patient for an MRI of the cervical spine to see if there is anything from a surgical standpoint that needs to be addressed.     We will also consider getting a nerve conduction study if the numbness does not improve after the therapy is completed.    I advised the patient to call and return sooner for new or worsening complaints of weakness, paresthesias, gait disturbances, or any additional concerns.  Treatment options discussed in detail with Jessica and the patient voiced understanding.  Ms. Allison agrees with this plan of care.     Patient is a nonsmoker  The patient's Body mass index is 23.04 kg/m².. BMI is within normal parameters. No follow-up required.    Diagnoses and all orders for this visit:    1. Degeneration of cervical intervertebral disc  -     XR Spine Cervical Flex & Ext Only  -     Ambulatory Referral to Physical Therapy Evaluate and treat, Neuro; Strengthening, ROM, Stretching; Full weight bearing  -     gabapentin (Neurontin) 100 MG capsule; Take 1 capsule by mouth 3 (Three) Times a Day.  Dispense: 90 capsule; Refill: 2    2. Cervicalgia  -     XR Spine Cervical Flex & Ext Only  -     Ambulatory Referral to Physical Therapy Evaluate and treat, Neuro; Strengthening, ROM, Stretching; Full weight bearing  -     gabapentin (Neurontin) 100 MG capsule; Take 1 capsule by mouth 3 (Three) Times a Day.  Dispense: 90 capsule; Refill: 2    3. Cervical radiculopathy  -     XR Spine Cervical Flex & Ext Only  -     Ambulatory Referral to Physical Therapy Evaluate and  treat, Neuro; Strengthening, ROM, Stretching; Full weight bearing  -     gabapentin (Neurontin) 100 MG capsule; Take 1 capsule by mouth 3 (Three) Times a Day.  Dispense: 90 capsule; Refill: 2    4. Left arm numbness  -     XR Spine Cervical Flex & Ext Only  -     Ambulatory Referral to Physical Therapy Evaluate and treat, Neuro; Strengthening, ROM, Stretching; Full weight bearing  -     gabapentin (Neurontin) 100 MG capsule; Take 1 capsule by mouth 3 (Three) Times a Day.  Dispense: 90 capsule; Refill: 2    5. Body mass index (BMI) of 23.0-23.9 in adult    6. Nonsmoker    Other orders  -     diclofenac (VOLTAREN) 75 MG EC tablet; Take 1 tablet by mouth 2 (Two) Times a Day.  Dispense: 60 tablet; Refill: 2          I discussed the patients findings and my recommendations with patient    Lester Lopez, APRN  01/10/24  11:31 CST

## 2024-03-05 ENCOUNTER — OFFICE VISIT (OUTPATIENT)
Dept: NEUROSURGERY | Facility: CLINIC | Age: 64
End: 2024-03-05
Payer: COMMERCIAL

## 2024-03-05 VITALS — BODY MASS INDEX: 23.11 KG/M2 | WEIGHT: 156 LBS | HEIGHT: 69 IN

## 2024-03-05 DIAGNOSIS — M54.2 CERVICALGIA: ICD-10-CM

## 2024-03-05 DIAGNOSIS — R20.0 LEFT ARM NUMBNESS: ICD-10-CM

## 2024-03-05 DIAGNOSIS — M50.30 DEGENERATION OF CERVICAL INTERVERTEBRAL DISC: Primary | ICD-10-CM

## 2024-03-05 DIAGNOSIS — M54.12 CERVICAL RADICULOPATHY: ICD-10-CM

## 2024-03-05 DIAGNOSIS — Z78.9 NONSMOKER: ICD-10-CM

## 2024-03-05 PROCEDURE — 99213 OFFICE O/P EST LOW 20 MIN: CPT | Performed by: NURSE PRACTITIONER

## 2024-03-05 NOTE — PROGRESS NOTES
"    Chief complaint:   Chief Complaint   Patient presents with    Neck Pain     Patient here for follow up visit. She [facility].        Subjective     HPI: This is a 63-year-old female patient who was referred to us by Dr. Deniz Peterson for neck pain and left upper extremity numbness. She is here to be evaluated today. The patient said that she did deal with neck issues for a few years. She did have a fall in October related to a syncopal episode that they feel was related to her blood pressure and did feel like her symptoms did get a little worse after the fall. Currently the pain in her neck is constant. It is worse with certain positions. It is better with ice. She has pain that goes into her scapular area to her shoulder that is intermittent. Nothing in particular makes it better or worse. She does have constant numbness and tingling from her elbow down to the last 2 digits of her hand. Denies any bowel or bladder incontinence. She has not done any pain management injections. She is attempted chiropractic care without any lasting improvement. Denies any tobacco or illicit drug use but she does drink alcohol occasionally. She is right-hand dominant. She is a retired .     The patient did go through a dedicated course of physical therapy at [facility].  She also had her x-rays completed of her cervical spine.  We also started her on gabapentin and diclofenac.  She is here in follow-up today.  In spite of these treatments she is continue complain of neck pain that radiates over into her left scapular area.  She also does continue to complain of some pain and numbness and tingling from her left elbow down to the last 2 digits of her left hand.    Review of Systems   Musculoskeletal:  Positive for neck pain and neck stiffness.   Neurological: Negative.          Objective      Vital Signs  Ht 175.3 cm (69\")   Wt 70.8 kg (156 lb)   LMP 01/01/2005   BMI 23.04 kg/m² "     Physical Exam  Constitutional:       Appearance: She is well-developed.   HENT:      Head: Normocephalic.   Eyes:      Extraocular Movements: EOM normal.      Pupils: Pupils are equal, round, and reactive to light.   Pulmonary:      Effort: Pulmonary effort is normal.   Musculoskeletal:         General: Normal range of motion.      Cervical back: Normal range of motion.   Skin:     General: Skin is warm.   Neurological:      Mental Status: She is alert and oriented to person, place, and time.      GCS: GCS eye subscore is 4. GCS verbal subscore is 5. GCS motor subscore is 6.      Cranial Nerves: No cranial nerve deficit.      Sensory: No sensory deficit.      Motor: Motor strength is normal.     Gait: Gait is intact. Gait normal.      Deep Tendon Reflexes: Reflexes are normal and symmetric.   Psychiatric:         Speech: Speech normal.         Behavior: Behavior normal.         Thought Content: Thought content normal.         Neurologic Exam     Mental Status   Oriented to person, place, and time.   Attention: normal. Concentration: normal.   Speech: speech is normal   Level of consciousness: alert  Normal comprehension.     Cranial Nerves     CN II   Visual fields full to confrontation.     CN III, IV, VI   Pupils are equal, round, and reactive to light.  Extraocular motions are normal.     CN V   Facial sensation intact.     CN VII   Facial expression full, symmetric.     CN VIII   CN VIII normal.     CN IX, X   CN IX normal.   CN X normal.     CN XI   CN XI normal.     CN XII   CN XII normal.     Motor Exam   Muscle bulk: normal    Strength   Strength 5/5 throughout.     Sensory Exam   Light touch normal.     Gait, Coordination, and Reflexes     Gait  Gait: normal    Reflexes   Reflexes 2+ except as noted.       Imaging review: X-rays of the cervical spine that was done on January 10, 2024 shows disc degeneration at C3-4, C4-5.  No abnormal motion noted in flexion or extension.        Assessment/Plan: The  patient is continue to complain of neck pain as well as some numbness and tingling in her left upper extremity.  I will have the patient go for an MRI of the cervical spine.  Will also order EMG/NCS of the left upper extremity.  I will have her follow-up with Dr. Alcaraz the next available appointment.  Depending on the results of the imaging will determine if the patient is a candidate for any kind of surgical intervention versus looking into epidural steroid injections.  Her questions and concerns were addressed.    Patient is a nonsmoker  The patient's Body mass index is 23.04 kg/m².. BMI is within normal parameters. No follow-up required.    Diagnoses and all orders for this visit:    1. Degeneration of cervical intervertebral disc (Primary)  -     MRI Cervical Spine Without Contrast; Future    2. Cervicalgia  -     MRI Cervical Spine Without Contrast; Future    3. Cervical radiculopathy  -     MRI Cervical Spine Without Contrast; Future    4. Left arm numbness  -     EMG & Nerve Conduction Test; Future    5. Body mass index (BMI) of 23.0-23.9 in adult    6. Nonsmoker        I discussed the patients findings and my recommendations with patient  Lester Lopez, APRN  03/05/24  11:29 CST

## 2024-03-26 ENCOUNTER — HOSPITAL ENCOUNTER (OUTPATIENT)
Dept: MRI IMAGING | Facility: HOSPITAL | Age: 64
Discharge: HOME | End: 2024-03-26
Admitting: NURSE PRACTITIONER
Payer: COMMERCIAL

## 2024-03-26 DIAGNOSIS — M54.12 CERVICAL RADICULOPATHY: ICD-10-CM

## 2024-03-26 DIAGNOSIS — M50.30 DEGENERATION OF CERVICAL INTERVERTEBRAL DISC: ICD-10-CM

## 2024-03-26 DIAGNOSIS — M54.2 CERVICALGIA: ICD-10-CM

## 2024-03-26 PROCEDURE — 72141 MRI NECK SPINE W/O DYE: CPT

## 2024-03-27 ENCOUNTER — TELEPHONE (OUTPATIENT)
Dept: NEUROSURGERY | Facility: CLINIC | Age: 64
End: 2024-03-27
Payer: COMMERCIAL

## 2024-03-27 DIAGNOSIS — R20.0 LEFT ARM NUMBNESS: ICD-10-CM

## 2024-03-27 DIAGNOSIS — M54.2 CERVICALGIA: ICD-10-CM

## 2024-03-27 DIAGNOSIS — M50.30 DEGENERATION OF CERVICAL INTERVERTEBRAL DISC: Primary | ICD-10-CM

## 2024-03-27 DIAGNOSIS — M54.12 CERVICAL RADICULOPATHY: ICD-10-CM

## 2024-03-27 NOTE — TELEPHONE ENCOUNTER
----- Message from GARRET Castanon sent at 3/27/2024  7:21 AM CDT -----  MRI shows degeneration and arthritis in her neck.  recommend that she try injections at pain management prior to her appointment with Dr. Alcaraz.  ----- Message -----  From: [facility] [place] [facility]  Sent: 3/27/2024   6:02 AM CDT  To: GARRET Castanon

## 2024-03-27 NOTE — TELEPHONE ENCOUNTER
Placed a call to inform pt of imaging results and recommendations. Pt stated she will go to [facility]. Informed pt we will put the referral in and the facility will give her a call. Ended call with pt understanding and acknowledgment.

## 2024-04-16 ENCOUNTER — TELEPHONE (OUTPATIENT)
Dept: NEUROLOGY | Facility: HOSPITAL | Age: 64
End: 2024-04-16
Payer: COMMERCIAL

## 2024-04-17 ENCOUNTER — HOSPITAL ENCOUNTER (OUTPATIENT)
Dept: NEUROLOGY | Facility: HOSPITAL | Age: 64
Discharge: HOME | End: 2024-04-17
Admitting: NURSE PRACTITIONER
Payer: COMMERCIAL

## 2024-04-17 DIAGNOSIS — R20.0 LEFT ARM NUMBNESS: ICD-10-CM

## 2024-04-17 PROCEDURE — 95909 NRV CNDJ TST 5-6 STUDIES: CPT

## 2024-04-17 PROCEDURE — 95886 MUSC TEST DONE W/N TEST COMP: CPT

## 2024-04-17 PROCEDURE — 95886 MUSC TEST DONE W/N TEST COMP: CPT | Performed by: PSYCHIATRY & NEUROLOGY

## 2024-04-17 PROCEDURE — 95909 NRV CNDJ TST 5-6 STUDIES: CPT | Performed by: PSYCHIATRY & NEUROLOGY

## 2024-04-18 NOTE — PROGRESS NOTES
PATIENT HAS BEEN PLACED ON MY PERSONAL CANCELLATION LIST.    RHONDA BRIAN  [facility]

## 2024-04-26 ENCOUNTER — TELEPHONE (OUTPATIENT)
Dept: NEUROSURGERY | Facility: CLINIC | Age: 64
End: 2024-04-26
Payer: COMMERCIAL

## 2024-04-26 NOTE — TELEPHONE ENCOUNTER
Called patient to offer her a sooner appt for 5/2/24 @ 3 pm.  She did not answer so I left a VM asking her to call me back.    RHONDA ROLLE [facility]  PHYSICIAN LEAD  DR ROHAN BRIAN  [facility]    IT IS OKAY FOR THE HUB TO DELIVER THIS INFORMATION TO THE PATIENT IF THEY RECEIVE THIS CALL BACK

## 2024-05-08 ENCOUNTER — TELEPHONE (OUTPATIENT)
Dept: NEUROSURGERY | Facility: CLINIC | Age: 64
End: 2024-05-08
Payer: COMMERCIAL

## 2024-07-02 ENCOUNTER — OFFICE VISIT (OUTPATIENT)
Dept: NEUROSURGERY | Facility: CLINIC | Age: 64
End: 2024-07-02
Payer: MEDICARE

## 2024-07-02 VITALS — BODY MASS INDEX: 21.77 KG/M2 | HEIGHT: 69 IN | WEIGHT: 147 LBS

## 2024-07-02 DIAGNOSIS — M48.02 SPINAL STENOSIS IN CERVICAL REGION: ICD-10-CM

## 2024-07-02 DIAGNOSIS — M50.30 DEGENERATION OF CERVICAL INTERVERTEBRAL DISC: Primary | ICD-10-CM

## 2024-07-02 DIAGNOSIS — Z78.9 NON-SMOKER: ICD-10-CM

## 2024-07-02 DIAGNOSIS — M54.12 CERVICAL RADICULOPATHY: ICD-10-CM

## 2024-07-02 DIAGNOSIS — R20.0 LEFT ARM NUMBNESS: ICD-10-CM

## 2024-07-02 DIAGNOSIS — G56.22 ULNAR NEUROPATHY AT ELBOW OF LEFT UPPER EXTREMITY: ICD-10-CM

## 2024-07-02 RX ORDER — PREGABALIN 50 MG/1
CAPSULE ORAL
COMMUNITY
Start: 2024-06-26

## 2024-07-02 NOTE — PROGRESS NOTES
SUBJECTIVE:  Patient ID: Jessica Allison is a 63 y.o. female is here today for follow-up.    Chief Complaint: Neck pain  Chief Complaint   Patient presents with    Results     EMG 4/17/24, MR 3/26/24  Patient with neck pain & LUE pain with N/T LUE (elbow to hand).    She has completed 8 visits of therapy @ [facility] without lasting relief of her symptoms.  She has also had 2 injections with [facility] without relief of her symptoms.       HPI  63-year-old female [place] following for complaints of neck pain, headaches left trapezius and scapular pain.  And then she also complains of numbness and tingling in an ulnar distribution in the left hand from the elbow to the fourth and fifth fingers.  She is done a dedicated course of physical therapy without any meaningful improvement.  She has had 2 injections from [facility] without any meaningful improvement.  She is also worked with a chiropractor.  She takes gabapentin and has tried anti-inflammatory medications.    The following portions of the patient's history were reviewed and updated as appropriate: allergies, current medications, past family history, past medical history, past social history, past surgical history and problem list.    OBJECTIVE:    Review of Systems   Musculoskeletal:  Positive for neck pain and neck stiffness.   Neurological:  Positive for numbness.   All other systems reviewed and are negative.         Physical Exam  Eyes:      Extraocular Movements: EOM normal.      Pupils: Pupils are equal, round, and reactive to light.   Neurological:      Mental Status: She is oriented to person, place, and time.      Motor: Motor strength is normal.     Coordination: Finger-Nose-Finger Test normal.      Gait: Gait is intact.   Psychiatric:         Speech: Speech normal.         Neurologic Exam     Mental Status   Oriented to person, place, and time.   Attention: normal.   Speech: speech is normal   Level of consciousness: alert  Knowledge:  good.     Cranial Nerves     CN II   Visual fields full to confrontation.     CN III, IV, VI   Pupils are equal, round, and reactive to light.  Extraocular motions are normal.     CN V   Facial sensation intact.     CN VII   Facial expression full, symmetric.     CN VIII   CN VIII normal.     CN IX, X   CN IX normal.   CN X normal.     CN XI   CN XI normal.     CN XII   CN XII normal.     Motor Exam   Muscle bulk: normal  Overall muscle tone: normal  Right arm pronator drift: absent  Left arm pronator drift: absent    Strength   Strength 5/5 throughout. Some wasting in the left hand consistent with an ulnar neuropathy.     Sensory Exam   Light touch normal.   Pinprick normal.     Gait, Coordination, and Reflexes     Gait  Gait: normal    Coordination   Finger to nose coordination: normal    Tremor   Resting tremor: absent  Intention tremor: absent  Action tremor: absent    Reflexes   Reflexes 2+ except as noted.       Independent Review of Radiographic Studies:       ASSESSMENT/PLAN:  Cervical degenerative disc disease.  The patient has notable degenerative disc disease at C3-4, 4 5, 5 6.  There is also notable foraminal stenosis at C4-5 and 5 6.  There is a loss of lordosis.  I think these findings are responsible for her neck pain, headaches and left shoulder and scapular pain.  She is gone through an extensive course of nonsurgical care for these issues.  This is included physical therapy, chiropractic care, injection treatments, medical management.  At this point I would recommend a three-level anterior cervical fusion involving C3-4, 4 5, 5 6.  Risk and benefits of the procedure were discussed at length which included but were not limited to infection, bleeding, paralysis, spinal fluid leak, speech and swallow difficulty, stroke, coma, and death.  Patient acknowledged understanding this.  Her questions and concerns were addressed.  Ulnar neuropathy.  The patient do think also has ulnar neuropathy.  The EMG  nerve conduction study was consistent with ulnar neuropathy versus a chronic C8 radiculopathy.  There is notable left foraminal stenosis not acknowledged by the radiologist at C7-T1 on the left.  I explained to the patient and her  very clearly that the planned neck surgery may not address the numbness and tingling in her left hand.  And this may be due to a combination of both cervical nerve compression at C7-T1 and an ulnar neuropathy.  We can reevaluate the left hand symptoms after her neck surgery is completed.  She acknowledged understand this.  Her questions and concerns were addressed.      1. Degeneration of cervical intervertebral disc    2. Spinal stenosis in cervical region    3. Cervical radiculopathy    4. Ulnar neuropathy at elbow of left upper extremity    5. Left arm numbness    6. Non-smoker    7. BMI 21.0-21.9, adult        The patient's Body mass index is 21.71 kg/m².. BMI is within normal parameters. No follow-up required.    Return for POSTOPERATIVELY.      Adama Alcaraz MD

## 2024-07-08 PROBLEM — M48.02 SPINAL STENOSIS IN CERVICAL REGION: Status: ACTIVE | Noted: 2024-07-02

## 2024-07-30 ENCOUNTER — PRE-ADMISSION TESTING (OUTPATIENT)
Dept: PREADMISSION TESTING | Facility: HOSPITAL | Age: 64
End: 2024-07-30
Payer: MEDICARE

## 2024-07-30 VITALS
RESPIRATION RATE: 18 BRPM | HEART RATE: 74 BPM | OXYGEN SATURATION: 98 % | DIASTOLIC BLOOD PRESSURE: 82 MMHG | HEIGHT: 68 IN | SYSTOLIC BLOOD PRESSURE: 115 MMHG | WEIGHT: 147.27 LBS | BODY MASS INDEX: 22.32 KG/M2

## 2024-07-30 DIAGNOSIS — M54.12 CERVICAL RADICULOPATHY: ICD-10-CM

## 2024-07-30 DIAGNOSIS — M50.30 DEGENERATION OF CERVICAL INTERVERTEBRAL DISC: ICD-10-CM

## 2024-07-30 DIAGNOSIS — M48.02 SPINAL STENOSIS IN CERVICAL REGION: ICD-10-CM

## 2024-07-30 LAB
ALBUMIN SERPL-MCNC: 4.4 G/DL (ref 3.5–5.2)
ALBUMIN/GLOB SERPL: 1.7 G/DL
ALP SERPL-CCNC: 78 U/L (ref 39–117)
ALT SERPL W P-5'-P-CCNC: 12 U/L (ref 1–33)
ANION GAP SERPL CALCULATED.3IONS-SCNC: 11 MMOL/L (ref 5–15)
AST SERPL-CCNC: 16 U/L (ref 1–32)
BILIRUB SERPL-MCNC: 1.3 MG/DL (ref 0–1.2)
BILIRUB UR QL STRIP: NEGATIVE
BUN SERPL-MCNC: 28 MG/DL (ref 8–23)
BUN/CREAT SERPL: 36.4 (ref 7–25)
CALCIUM SPEC-SCNC: 9.5 MG/DL (ref 8.6–10.5)
CHLORIDE SERPL-SCNC: 105 MMOL/L (ref 98–107)
CLARITY UR: CLEAR
CO2 SERPL-SCNC: 25 MMOL/L (ref 22–29)
COLOR UR: YELLOW
CREAT SERPL-MCNC: 0.77 MG/DL (ref 0.57–1)
DEPRECATED RDW RBC AUTO: 44 FL (ref 37–54)
EGFRCR SERPLBLD CKD-EPI 2021: 85.7 ML/MIN/1.73
ERYTHROCYTE [DISTWIDTH] IN BLOOD BY AUTOMATED COUNT: 12.8 % (ref 12.3–15.4)
GLOBULIN UR ELPH-MCNC: 2.6 GM/DL
GLUCOSE SERPL-MCNC: 101 MG/DL (ref 65–99)
GLUCOSE UR STRIP-MCNC: NEGATIVE MG/DL
HCT VFR BLD AUTO: 36 % (ref 34–46.6)
HGB BLD-MCNC: 11.9 G/DL (ref 12–15.9)
HGB UR QL STRIP.AUTO: NEGATIVE
KETONES UR QL STRIP: NEGATIVE
LEUKOCYTE ESTERASE UR QL STRIP.AUTO: NEGATIVE
MCH RBC QN AUTO: 31.1 PG (ref 26.6–33)
MCHC RBC AUTO-ENTMCNC: 33.1 G/DL (ref 31.5–35.7)
MCV RBC AUTO: 94 FL (ref 79–97)
NITRITE UR QL STRIP: NEGATIVE
PH UR STRIP.AUTO: 5.5 [PH] (ref 5–8)
PLATELET # BLD AUTO: 277 10*3/MM3 (ref 140–450)
PMV BLD AUTO: 10 FL (ref 6–12)
POTASSIUM SERPL-SCNC: 3.9 MMOL/L (ref 3.5–5.2)
PROT SERPL-MCNC: 7 G/DL (ref 6–8.5)
PROT UR QL STRIP: NEGATIVE
RBC # BLD AUTO: 3.83 10*6/MM3 (ref 3.77–5.28)
SODIUM SERPL-SCNC: 141 MMOL/L (ref 136–145)
SP GR UR STRIP: 1.01 (ref 1–1.03)
UROBILINOGEN UR QL STRIP: NORMAL
WBC NRBC COR # BLD AUTO: 5.19 10*3/MM3 (ref 3.4–10.8)

## 2024-07-30 PROCEDURE — 36415 COLL VENOUS BLD VENIPUNCTURE: CPT

## 2024-07-30 PROCEDURE — 93005 ELECTROCARDIOGRAM TRACING: CPT

## 2024-07-30 PROCEDURE — 81003 URINALYSIS AUTO W/O SCOPE: CPT

## 2024-07-30 PROCEDURE — 85027 COMPLETE CBC AUTOMATED: CPT

## 2024-07-30 PROCEDURE — 80053 COMPREHEN METABOLIC PANEL: CPT

## 2024-07-30 NOTE — DISCHARGE INSTRUCTIONS
Preparing for Surgery  Follow these instructions before the procedure:  Several days or weeks before your procedure        Ask your health care provider about:  Changing or stopping your regular medicines. This is especially important if you are taking diabetes medicines or blood thinners.  Taking medicines such as aspirin and ibuprofen. These medicines can thin your blood. Do not take these medicines unless your health care provider tells you to take them.  Taking over-the-counter medicines, vitamins, herbs, and supplements.    Contact your surgeon if you:  Develop a fever of more than 100.4°F (38°C) or other feelings of illness during the 48 hours before your surgery.  Have symptoms that get worse.  Have questions or concerns about your surgery.  If you are going home the same day of your surgery you will need to arrange for a responsible adult, age 18 years old or older, to drive you home from the hospital and stay with you for 24 hours. Verification of the  will be made prior to any procedure requiring sedation. You may not go home in a taxi or any form of public transportation by yourself.     Day before your procedure  Medication(s) you need to stop the day before your surgery:LOSARTAN    24 hours before your procedure DO NOT drink alcoholic beverages or smoke.  24 hours before your procedure STOP taking Erectile Dysfunction medication (i.e.,Cialis, Viagra)   You may be asked to shower with a germ-killing soap.  Day of your procedure         8 hours before your procedure STOP all food, any dairy products, and full liquids. This includes hard candy, chewing gum or mints. This is extremely important to prevent serious complications.     Up to 2 hours before your scheduled arrival time, you may have clear liquids no cream, powder, or pulp of any kind. Safe options are water, black coffee, plain tea, soda, Gatorade/Powerade, clear broth, apple juice.    2 hours before your scheduled arrival time, STOP  drinking clear liquids.    You may need to take another shower with a germ-killing soap before you leave home in the morning. Do not use perfumes, colognes, or body lotions.  Wear comfortable loose-fitting clothing.  Remove all jewelry including body piercing and rings, dark colored nail polish, and make up prior to arrival at the hospital. Leave all valuables at home.   Bring your hearing aids if you rely on them.  Do not wear contact lenses. If you wear eyeglasses remember to bring a case to store them in while you are in surgery.  Do not use denture adhesives since you will be asked to remove them during your surgery.    You do not need to bring your home medications into the hospital.   Bring your sleep apnea device with you on the day of your surgery (if this applies to you).  If you wear portable oxygen, bring it with you.   If you are staying overnight, you may bring a bag of items you may need such as slippers, robe and a change of clothes for your discharge. You may want to leave these items in the car until you are ready for them since your family will take your belongings when you leave the pre-operative area.  Arrive at the hospital as scheduled by the office. You will be asked to arrive 2 hours prior to your surgery time in order to prepare for your procedure.  When you arrive at the hospital  Go to the registration desk located at the main entrance of the hospital.  After registration is completed, you will be given a beeper and a sticker sheet. Take the stickers to Outpatient Surgery and place in the tray at the end of the desk to notify the staff that you have arrived and registered.   Return to the lobby to wait. You are not always called back according to the time of arrival but rather the time your doctor will be ready.  When your beeper lights up and vibrates proceed through the double doors, under the stairs, and a member of the Outpatient Surgery staff will escort you to your preoperative room.    How to Use Chlorhexidine Before Surgery  Chlorhexidine gluconate (CHG) is a germ-killing (antiseptic) solution that is used to clean the skin. It can get rid of the bacteria that normally live on the skin and can keep them away for about 24 hours. To clean your skin with CHG, you may be given:  A CHG solution to use in the shower or as part of a sponge bath.  A prepackaged cloth that contains CHG.  Cleaning your skin with CHG may help lower the risk for infection:  While you are staying in the intensive care unit of the hospital.  If you have a vascular access, such as a central line, to provide short-term or long-term access to your veins.  If you have a catheter to drain urine from your bladder.  If you are on a ventilator. A ventilator is a machine that helps you breathe by moving air in and out of your lungs.  After surgery.  What are the risks?  Risks of using CHG include:  A skin reaction.  Hearing loss, if CHG gets in your ears and you have a perforated eardrum.  Eye injury, if CHG gets in your eyes and is not rinsed out.  The CHG product catching fire.  Make sure that you avoid smoking and flames after applying CHG to your skin.  Do not use CHG:  If you have a chlorhexidine allergy or have previously reacted to chlorhexidine.  On babies younger than 2 months of age.  How to use CHG solution  Use CHG only as told by your health care provider, and follow the instructions on the label.  Use the full amount of CHG as directed. Usually, this is one bottle.  During a shower    Follow these steps when using CHG solution during a shower (unless your health care provider gives you different instructions):  Start the shower.  Use your normal soap and shampoo to wash your face and hair.  Turn off the shower or move out of the shower stream.  Pour the CHG onto a clean washcloth. Do not use any type of brush or rough-edged sponge.  Starting at your neck, lather your body down to your toes. Make sure you follow these  instructions:  If you will be having surgery, pay special attention to the part of your body where you will be having surgery. Scrub this area for at least 1 minute.  Do not use CHG on your head or face. If the solution gets into your ears or eyes, rinse them well with water.  Avoid your genital area.  Avoid any areas of skin that have broken skin, cuts, or scrapes.  Scrub your back and under your arms. Make sure to wash skin folds.  Let the lather sit on your skin for 1-2 minutes or as long as told by your health care provider.  Thoroughly rinse your entire body in the shower. Make sure that all body creases and crevices are rinsed well.  Dry off with a clean towel. Do not put any substances on your body afterward--such as powder, lotion, or perfume--unless you are told to do so by your health care provider. Only use lotions that are recommended by the .  Put on clean clothes or pajamas.  If it is the night before your surgery, sleep in clean sheets.     During a sponge bath  Follow these steps when using CHG solution during a sponge bath (unless your health care provider gives you different instructions):  Use your normal soap and shampoo to wash your face and hair.  Pour the CHG onto a clean washcloth.  Starting at your neck, lather your body down to your toes. Make sure you follow these instructions:  If you will be having surgery, pay special attention to the part of your body where you will be having surgery. Scrub this area for at least 1 minute.  Do not use CHG on your head or face. If the solution gets into your ears or eyes, rinse them well with water.  Avoid your genital area.  Avoid any areas of skin that have broken skin, cuts, or scrapes.  Scrub your back and under your arms. Make sure to wash skin folds.  Let the lather sit on your skin for 1-2 minutes or as long as told by your health care provider.  Using a different clean, wet washcloth, thoroughly rinse your entire body. Make sure that  all body creases and crevices are rinsed well.  Dry off with a clean towel. Do not put any substances on your body afterward--such as powder, lotion, or perfume--unless you are told to do so by your health care provider. Only use lotions that are recommended by the .  Put on clean clothes or pajamas.  If it is the night before your surgery, sleep in clean sheets.  How to use CHG prepackaged cloths  Only use CHG cloths as told by your health care provider, and follow the instructions on the label.  Use the CHG cloth on clean, dry skin.  Do not use the CHG cloth on your head or face unless your health care provider tells you to.  When washing with the CHG cloth:  Avoid your genital area.  Avoid any areas of skin that have broken skin, cuts, or scrapes.  Before surgery    Follow these steps when using a CHG cloth to clean before surgery (unless your health care provider gives you different instructions):  Using the CHG cloth, vigorously scrub the part of your body where you will be having surgery. Scrub using a back-and-forth motion for 3 minutes. The area on your body should be completely wet with CHG when you are done scrubbing.  Do not rinse. Discard the cloth and let the area air-dry. Do not put any substances on the area afterward, such as powder, lotion, or perfume.  Put on clean clothes or pajamas.  If it is the night before your surgery, sleep in clean sheets.     For general bathing  Follow these steps when using CHG cloths for general bathing (unless your health care provider gives you different instructions).  Use a separate CHG cloth for each area of your body. Make sure you wash between any folds of skin and between your fingers and toes. Wash your body in the following order, switching to a new cloth after each step:  The front of your neck, shoulders, and chest.  Both of your arms, under your arms, and your hands.  Your stomach and groin area, avoiding the genitals.  Your right leg and  foot.  Your left leg and foot.  The back of your neck, your back, and your buttocks.  Do not rinse. Discard the cloth and let the area air-dry. Do not put any substances on your body afterward--such as powder, lotion, or perfume--unless you are told to do so by your health care provider. Only use lotions that are recommended by the .  Put on clean clothes or pajamas.  Contact a health care provider if:  Your skin gets irritated after scrubbing.  You have questions about using your solution or cloth.  You swallow any chlorhexidine. Call your local poison control center (1-157.652.4673 in the [place]  Get help right away if:  Your eyes itch badly, or they become very red or swollen.  Your skin itches badly and is red or swollen.  Your hearing changes.  You have trouble seeing.  You have swelling or tingling in your mouth or throat.  You have trouble breathing.  These symptoms may represent a serious problem that is an emergency. Do not wait to see if the symptoms will go away. Get medical help right away. Call your local emergency services (404 in the [place] Do not drive yourself to the hospital.  Summary  Chlorhexidine gluconate (CHG) is a germ-killing (antiseptic) solution that is used to clean the skin. Cleaning your skin with CHG may help to lower your risk for infection.  You may be given CHG to use for bathing. It may be in a bottle or in a prepackaged cloth to use on your skin. Carefully follow your health care provider's instructions and the instructions on the product label.  Do not use CHG if you have a chlorhexidine allergy.  Contact your health care provider if your skin gets irritated after scrubbing.  This information is not intended to replace advice given to you by your health care provider. Make sure you discuss any questions you have with your health care provider.  Document Revised: 04/17/2023 Document Reviewed: 02/28/2022  Elsevier Patient Education © 2023 [employer]

## 2024-07-31 LAB
QT INTERVAL: 400 MS
QTC INTERVAL: 432 MS

## 2024-08-14 ENCOUNTER — ANESTHESIA (OUTPATIENT)
Dept: PERIOP | Facility: HOSPITAL | Age: 64
End: 2024-08-14
Payer: MEDICARE

## 2024-08-14 ENCOUNTER — HOSPITAL ENCOUNTER (INPATIENT)
Facility: HOSPITAL | Age: 64
LOS: 1 days | Discharge: HOME | End: 2024-08-15
Attending: NEUROLOGICAL SURGERY | Admitting: NEUROLOGICAL SURGERY
Payer: MEDICARE

## 2024-08-14 ENCOUNTER — APPOINTMENT (OUTPATIENT)
Dept: GENERAL RADIOLOGY | Facility: HOSPITAL | Age: 64
End: 2024-08-14
Payer: MEDICARE

## 2024-08-14 ENCOUNTER — ANESTHESIA EVENT (OUTPATIENT)
Dept: PERIOP | Facility: HOSPITAL | Age: 64
End: 2024-08-14
Payer: MEDICARE

## 2024-08-14 DIAGNOSIS — M54.12 CERVICAL RADICULOPATHY: ICD-10-CM

## 2024-08-14 DIAGNOSIS — M50.30 DEGENERATION OF CERVICAL INTERVERTEBRAL DISC: ICD-10-CM

## 2024-08-14 DIAGNOSIS — M48.02 SPINAL STENOSIS IN CERVICAL REGION: ICD-10-CM

## 2024-08-14 LAB
ABO GROUP BLD: NORMAL
BLD GP AB SCN SERPL QL: NEGATIVE
RH BLD: NEGATIVE
T&S EXPIRATION DATE: NORMAL

## 2024-08-14 PROCEDURE — 25010000002 PROPOFOL 1000 MG/100ML EMULSION: Performed by: NURSE ANESTHETIST, CERTIFIED REGISTERED

## 2024-08-14 PROCEDURE — C1713 ANCHOR/SCREW BN/BN,TIS/BN: HCPCS | Performed by: NEUROLOGICAL SURGERY

## 2024-08-14 PROCEDURE — 25010000002 CEFAZOLIN PER 500 MG: Performed by: NEUROLOGICAL SURGERY

## 2024-08-14 PROCEDURE — 25010000002 FENTANYL CITRATE (PF) 50 MCG/ML SOLUTION: Performed by: NURSE ANESTHETIST, CERTIFIED REGISTERED

## 2024-08-14 PROCEDURE — 88304 TISSUE EXAM BY PATHOLOGIST: CPT | Performed by: NEUROLOGICAL SURGERY

## 2024-08-14 PROCEDURE — 25010000002 DEXAMETHASONE PER 1 MG: Performed by: ANESTHESIOLOGY

## 2024-08-14 PROCEDURE — 86901 BLOOD TYPING SEROLOGIC RH(D): CPT | Performed by: NEUROLOGICAL SURGERY

## 2024-08-14 PROCEDURE — 0RG20A0 FUSION OF 2 OR MORE CERVICAL VERTEBRAL JOINTS WITH INTERBODY FUSION DEVICE, ANTERIOR APPROACH, ANTERIOR COLUMN, OPEN APPROACH: ICD-10-PCS | Performed by: NEUROLOGICAL SURGERY

## 2024-08-14 PROCEDURE — 25010000002 ONDANSETRON PER 1 MG: Performed by: NURSE ANESTHETIST, CERTIFIED REGISTERED

## 2024-08-14 PROCEDURE — 22853 INSJ BIOMECHANICAL DEVICE: CPT | Performed by: NEUROLOGICAL SURGERY

## 2024-08-14 PROCEDURE — 86900 BLOOD TYPING SEROLOGIC ABO: CPT | Performed by: NEUROLOGICAL SURGERY

## 2024-08-14 PROCEDURE — 22552 ARTHRD ANT NTRBD CERVICAL EA: CPT | Performed by: NEUROLOGICAL SURGERY

## 2024-08-14 PROCEDURE — 22846 INSERT SPINE FIXATION DEVICE: CPT | Performed by: NEUROLOGICAL SURGERY

## 2024-08-14 PROCEDURE — 25810000003 SODIUM CHLORIDE PER 500 ML: Performed by: NEUROLOGICAL SURGERY

## 2024-08-14 PROCEDURE — 86850 RBC ANTIBODY SCREEN: CPT | Performed by: NEUROLOGICAL SURGERY

## 2024-08-14 PROCEDURE — 22551 ARTHRD ANT NTRBDY CERVICAL: CPT | Performed by: NEUROLOGICAL SURGERY

## 2024-08-14 PROCEDURE — 25010000002 HYDROMORPHONE PER 4 MG: Performed by: ANESTHESIOLOGY

## 2024-08-14 PROCEDURE — 25810000003 LACTATED RINGERS PER 1000 ML: Performed by: NEUROLOGICAL SURGERY

## 2024-08-14 PROCEDURE — 25010000002 MIDAZOLAM PER 1 MG: Performed by: ANESTHESIOLOGY

## 2024-08-14 PROCEDURE — 25010000002 DEXAMETHASONE PER 1 MG: Performed by: NURSE ANESTHETIST, CERTIFIED REGISTERED

## 2024-08-14 PROCEDURE — 72040 X-RAY EXAM NECK SPINE 2-3 VW: CPT

## 2024-08-14 PROCEDURE — 76000 FLUOROSCOPY <1 HR PHYS/QHP: CPT

## 2024-08-14 PROCEDURE — 25010000002 CEFAZOLIN PER 500 MG: Performed by: NURSE PRACTITIONER

## 2024-08-14 PROCEDURE — 25810000003 SODIUM CHLORIDE 0.9 % SOLUTION: Performed by: NURSE PRACTITIONER

## 2024-08-14 PROCEDURE — 25010000002 GLYCOPYRROLATE 0.4 MG/2ML SOLUTION: Performed by: NURSE ANESTHETIST, CERTIFIED REGISTERED

## 2024-08-14 PROCEDURE — S0260 H&P FOR SURGERY: HCPCS | Performed by: NEUROLOGICAL SURGERY

## 2024-08-14 PROCEDURE — 0RB30ZZ EXCISION OF CERVICAL VERTEBRAL DISC, OPEN APPROACH: ICD-10-PCS | Performed by: NEUROLOGICAL SURGERY

## 2024-08-14 PROCEDURE — 88311 DECALCIFY TISSUE: CPT | Performed by: NEUROLOGICAL SURGERY

## 2024-08-14 DEVICE — CAGE 5030764 ANATOMIC PTC 16X14X7MM
Type: IMPLANTABLE DEVICE | Site: SPINE CERVICAL | Status: FUNCTIONAL
Brand: ANATOMIC PEEK PTC CERVICAL FUSION SYSTEM

## 2024-08-14 DEVICE — KT HEMOST ABS SURGIFOAM PORCN 1GRAM: Type: IMPLANTABLE DEVICE | Site: SPINE CERVICAL | Status: FUNCTIONAL

## 2024-08-14 DEVICE — SCREW 7713513 ZEVO VAR SD 3.5MM X 13MM
Type: IMPLANTABLE DEVICE | Site: SPINE CERVICAL | Status: FUNCTIONAL
Brand: ZEVO™ ANTERIOR CERVICAL PLATE SYSTEM

## 2024-08-14 DEVICE — DBM T43103 2.5CC GRAFTON PUTTY
Type: IMPLANTABLE DEVICE | Site: SPINE CERVICAL | Status: FUNCTIONAL
Brand: GRAFTON®AND GRAFTON PLUS®DEMINERALIZED BONE MATRIX (DBM)

## 2024-08-14 RX ORDER — POLYETHYLENE GLYCOL 3350 17 G/17G
17 POWDER, FOR SOLUTION ORAL DAILY PRN
Status: DISCONTINUED | OUTPATIENT
Start: 2024-08-14 | End: 2024-08-15 | Stop reason: HOSPADM

## 2024-08-14 RX ORDER — SODIUM CHLORIDE 0.9 % (FLUSH) 0.9 %
10 SYRINGE (ML) INJECTION EVERY 12 HOURS SCHEDULED
Status: DISCONTINUED | OUTPATIENT
Start: 2024-08-14 | End: 2024-08-14 | Stop reason: HOSPADM

## 2024-08-14 RX ORDER — GLYCOPYRROLATE 0.2 MG/ML
INJECTION INTRAMUSCULAR; INTRAVENOUS AS NEEDED
Status: DISCONTINUED | OUTPATIENT
Start: 2024-08-14 | End: 2024-08-14 | Stop reason: SURG

## 2024-08-14 RX ORDER — FENTANYL CITRATE 50 UG/ML
INJECTION, SOLUTION INTRAMUSCULAR; INTRAVENOUS AS NEEDED
Status: DISCONTINUED | OUTPATIENT
Start: 2024-08-14 | End: 2024-08-14 | Stop reason: SURG

## 2024-08-14 RX ORDER — MIDAZOLAM HYDROCHLORIDE 1 MG/ML
0.5 INJECTION INTRAMUSCULAR; INTRAVENOUS
Status: DISCONTINUED | OUTPATIENT
Start: 2024-08-14 | End: 2024-08-14 | Stop reason: HOSPADM

## 2024-08-14 RX ORDER — MIDAZOLAM HYDROCHLORIDE 1 MG/ML
1 INJECTION INTRAMUSCULAR; INTRAVENOUS
Status: DISCONTINUED | OUTPATIENT
Start: 2024-08-14 | End: 2024-08-14 | Stop reason: HOSPADM

## 2024-08-14 RX ORDER — DEXAMETHASONE SODIUM PHOSPHATE 4 MG/ML
INJECTION, SOLUTION INTRA-ARTICULAR; INTRALESIONAL; INTRAMUSCULAR; INTRAVENOUS; SOFT TISSUE AS NEEDED
Status: DISCONTINUED | OUTPATIENT
Start: 2024-08-14 | End: 2024-08-14 | Stop reason: SURG

## 2024-08-14 RX ORDER — LOSARTAN POTASSIUM 50 MG/1
50 TABLET ORAL 2 TIMES DAILY
Status: DISCONTINUED | OUTPATIENT
Start: 2024-08-14 | End: 2024-08-15 | Stop reason: HOSPADM

## 2024-08-14 RX ORDER — SODIUM CHLORIDE, SODIUM LACTATE, POTASSIUM CHLORIDE, CALCIUM CHLORIDE 600; 310; 30; 20 MG/100ML; MG/100ML; MG/100ML; MG/100ML
1000 INJECTION, SOLUTION INTRAVENOUS CONTINUOUS
Status: DISCONTINUED | OUTPATIENT
Start: 2024-08-14 | End: 2024-08-14

## 2024-08-14 RX ORDER — SODIUM CHLORIDE 0.9 % (FLUSH) 0.9 %
10 SYRINGE (ML) INJECTION AS NEEDED
Status: DISCONTINUED | OUTPATIENT
Start: 2024-08-14 | End: 2024-08-14 | Stop reason: HOSPADM

## 2024-08-14 RX ORDER — SODIUM CHLORIDE 0.9 % (FLUSH) 0.9 %
10 SYRINGE (ML) INJECTION AS NEEDED
Status: DISCONTINUED | OUTPATIENT
Start: 2024-08-14 | End: 2024-08-15 | Stop reason: HOSPADM

## 2024-08-14 RX ORDER — SODIUM CHLORIDE 0.9 % (FLUSH) 0.9 %
3 SYRINGE (ML) INJECTION AS NEEDED
Status: DISCONTINUED | OUTPATIENT
Start: 2024-08-14 | End: 2024-08-14 | Stop reason: HOSPADM

## 2024-08-14 RX ORDER — DROPERIDOL 2.5 MG/ML
0.62 INJECTION, SOLUTION INTRAMUSCULAR; INTRAVENOUS ONCE AS NEEDED
Status: DISCONTINUED | OUTPATIENT
Start: 2024-08-14 | End: 2024-08-14 | Stop reason: HOSPADM

## 2024-08-14 RX ORDER — FENTANYL CITRATE 50 UG/ML
50 INJECTION, SOLUTION INTRAMUSCULAR; INTRAVENOUS
Status: DISCONTINUED | OUTPATIENT
Start: 2024-08-14 | End: 2024-08-14 | Stop reason: HOSPADM

## 2024-08-14 RX ORDER — AMOXICILLIN 250 MG
2 CAPSULE ORAL 2 TIMES DAILY
Status: DISCONTINUED | OUTPATIENT
Start: 2024-08-14 | End: 2024-08-15 | Stop reason: HOSPADM

## 2024-08-14 RX ORDER — OXYCODONE AND ACETAMINOPHEN 10; 325 MG/1; MG/1
1 TABLET ORAL EVERY 4 HOURS PRN
Status: DISCONTINUED | OUTPATIENT
Start: 2024-08-14 | End: 2024-08-14 | Stop reason: HOSPADM

## 2024-08-14 RX ORDER — PROPOFOL 10 MG/ML
INJECTION, EMULSION INTRAVENOUS AS NEEDED
Status: DISCONTINUED | OUTPATIENT
Start: 2024-08-14 | End: 2024-08-14 | Stop reason: SURG

## 2024-08-14 RX ORDER — ACETAMINOPHEN 650 MG/1
650 SUPPOSITORY RECTAL EVERY 4 HOURS PRN
Status: DISCONTINUED | OUTPATIENT
Start: 2024-08-14 | End: 2024-08-15 | Stop reason: HOSPADM

## 2024-08-14 RX ORDER — IBUPROFEN 600 MG/1
600 TABLET ORAL EVERY 6 HOURS PRN
Status: DISCONTINUED | OUTPATIENT
Start: 2024-08-14 | End: 2024-08-14 | Stop reason: HOSPADM

## 2024-08-14 RX ORDER — LIDOCAINE HYDROCHLORIDE 10 MG/ML
0.5 INJECTION, SOLUTION EPIDURAL; INFILTRATION; INTRACAUDAL; PERINEURAL ONCE AS NEEDED
Status: DISCONTINUED | OUTPATIENT
Start: 2024-08-14 | End: 2024-08-14 | Stop reason: HOSPADM

## 2024-08-14 RX ORDER — SODIUM CHLORIDE 0.9 % (FLUSH) 0.9 %
3 SYRINGE (ML) INJECTION EVERY 12 HOURS SCHEDULED
Status: DISCONTINUED | OUTPATIENT
Start: 2024-08-14 | End: 2024-08-15 | Stop reason: HOSPADM

## 2024-08-14 RX ORDER — ACETAMINOPHEN 325 MG/1
650 TABLET ORAL EVERY 4 HOURS PRN
Status: DISCONTINUED | OUTPATIENT
Start: 2024-08-14 | End: 2024-08-15 | Stop reason: HOSPADM

## 2024-08-14 RX ORDER — BISACODYL 5 MG/1
5 TABLET, DELAYED RELEASE ORAL DAILY PRN
Status: DISCONTINUED | OUTPATIENT
Start: 2024-08-14 | End: 2024-08-15 | Stop reason: HOSPADM

## 2024-08-14 RX ORDER — ATORVASTATIN CALCIUM 10 MG/1
10 TABLET, FILM COATED ORAL NIGHTLY
Status: DISCONTINUED | OUTPATIENT
Start: 2024-08-14 | End: 2024-08-15 | Stop reason: HOSPADM

## 2024-08-14 RX ORDER — FENTANYL CITRATE 50 UG/ML
25 INJECTION, SOLUTION INTRAMUSCULAR; INTRAVENOUS
Status: DISCONTINUED | OUTPATIENT
Start: 2024-08-14 | End: 2024-08-14 | Stop reason: HOSPADM

## 2024-08-14 RX ORDER — SODIUM CHLORIDE 9 MG/ML
INJECTION, SOLUTION INTRAVENOUS AS NEEDED
Status: DISCONTINUED | OUTPATIENT
Start: 2024-08-14 | End: 2024-08-14 | Stop reason: HOSPADM

## 2024-08-14 RX ORDER — NALOXONE HCL 0.4 MG/ML
0.4 VIAL (ML) INJECTION
Status: DISCONTINUED | OUTPATIENT
Start: 2024-08-14 | End: 2024-08-15 | Stop reason: HOSPADM

## 2024-08-14 RX ORDER — BUPIVACAINE HYDROCHLORIDE AND EPINEPHRINE 2.5; 5 MG/ML; UG/ML
INJECTION, SOLUTION INFILTRATION; PERINEURAL AS NEEDED
Status: DISCONTINUED | OUTPATIENT
Start: 2024-08-14 | End: 2024-08-14 | Stop reason: HOSPADM

## 2024-08-14 RX ORDER — SUCCINYLCHOLINE/SOD CL,ISO/PF 200MG/10ML
SYRINGE (ML) INTRAVENOUS AS NEEDED
Status: DISCONTINUED | OUTPATIENT
Start: 2024-08-14 | End: 2024-08-14 | Stop reason: SURG

## 2024-08-14 RX ORDER — CYCLOBENZAPRINE HCL 10 MG
10 TABLET ORAL 3 TIMES DAILY PRN
Status: DISCONTINUED | OUTPATIENT
Start: 2024-08-14 | End: 2024-08-15 | Stop reason: HOSPADM

## 2024-08-14 RX ORDER — FLUMAZENIL 0.1 MG/ML
0.2 INJECTION INTRAVENOUS AS NEEDED
Status: DISCONTINUED | OUTPATIENT
Start: 2024-08-14 | End: 2024-08-14 | Stop reason: HOSPADM

## 2024-08-14 RX ORDER — LABETALOL HYDROCHLORIDE 5 MG/ML
5 INJECTION, SOLUTION INTRAVENOUS
Status: DISCONTINUED | OUTPATIENT
Start: 2024-08-14 | End: 2024-08-14 | Stop reason: HOSPADM

## 2024-08-14 RX ORDER — DEXTROSE MONOHYDRATE 25 G/50ML
12.5 INJECTION, SOLUTION INTRAVENOUS AS NEEDED
Status: DISCONTINUED | OUTPATIENT
Start: 2024-08-14 | End: 2024-08-14 | Stop reason: HOSPADM

## 2024-08-14 RX ORDER — MORPHINE SULFATE 2 MG/ML
1 INJECTION, SOLUTION INTRAMUSCULAR; INTRAVENOUS EVERY 4 HOURS PRN
Status: DISCONTINUED | OUTPATIENT
Start: 2024-08-14 | End: 2024-08-15 | Stop reason: HOSPADM

## 2024-08-14 RX ORDER — HYDROCODONE BITARTRATE AND ACETAMINOPHEN 5; 325 MG/1; MG/1
1 TABLET ORAL EVERY 4 HOURS PRN
Status: DISCONTINUED | OUTPATIENT
Start: 2024-08-14 | End: 2024-08-15 | Stop reason: HOSPADM

## 2024-08-14 RX ORDER — DEXAMETHASONE SODIUM PHOSPHATE 4 MG/ML
4 INJECTION, SOLUTION INTRA-ARTICULAR; INTRALESIONAL; INTRAMUSCULAR; INTRAVENOUS; SOFT TISSUE ONCE AS NEEDED
Status: COMPLETED | OUTPATIENT
Start: 2024-08-14 | End: 2024-08-14

## 2024-08-14 RX ORDER — BISACODYL 10 MG
10 SUPPOSITORY, RECTAL RECTAL DAILY PRN
Status: DISCONTINUED | OUTPATIENT
Start: 2024-08-14 | End: 2024-08-15 | Stop reason: HOSPADM

## 2024-08-14 RX ORDER — SODIUM CHLORIDE, SODIUM LACTATE, POTASSIUM CHLORIDE, CALCIUM CHLORIDE 600; 310; 30; 20 MG/100ML; MG/100ML; MG/100ML; MG/100ML
9 INJECTION, SOLUTION INTRAVENOUS CONTINUOUS
Status: DISCONTINUED | OUTPATIENT
Start: 2024-08-14 | End: 2024-08-14

## 2024-08-14 RX ORDER — SCOLOPAMINE TRANSDERMAL SYSTEM 1 MG/1
1 PATCH, EXTENDED RELEASE TRANSDERMAL ONCE
Status: DISCONTINUED | OUTPATIENT
Start: 2024-08-14 | End: 2024-08-14

## 2024-08-14 RX ORDER — HYDROMORPHONE HYDROCHLORIDE 1 MG/ML
0.5 INJECTION, SOLUTION INTRAMUSCULAR; INTRAVENOUS; SUBCUTANEOUS
Status: DISCONTINUED | OUTPATIENT
Start: 2024-08-14 | End: 2024-08-14 | Stop reason: HOSPADM

## 2024-08-14 RX ORDER — ONDANSETRON 2 MG/ML
4 INJECTION INTRAMUSCULAR; INTRAVENOUS
Status: DISCONTINUED | OUTPATIENT
Start: 2024-08-14 | End: 2024-08-14 | Stop reason: HOSPADM

## 2024-08-14 RX ORDER — LEVOTHYROXINE SODIUM 0.05 MG/1
50 TABLET ORAL
Status: DISCONTINUED | OUTPATIENT
Start: 2024-08-14 | End: 2024-08-15 | Stop reason: HOSPADM

## 2024-08-14 RX ORDER — ONDANSETRON 2 MG/ML
4 INJECTION INTRAMUSCULAR; INTRAVENOUS EVERY 6 HOURS PRN
Status: DISCONTINUED | OUTPATIENT
Start: 2024-08-14 | End: 2024-08-15 | Stop reason: HOSPADM

## 2024-08-14 RX ORDER — SODIUM CHLORIDE 9 MG/ML
40 INJECTION, SOLUTION INTRAVENOUS AS NEEDED
Status: DISCONTINUED | OUTPATIENT
Start: 2024-08-14 | End: 2024-08-15 | Stop reason: HOSPADM

## 2024-08-14 RX ORDER — MAGNESIUM HYDROXIDE 1200 MG/15ML
LIQUID ORAL AS NEEDED
Status: DISCONTINUED | OUTPATIENT
Start: 2024-08-14 | End: 2024-08-14 | Stop reason: HOSPADM

## 2024-08-14 RX ORDER — SODIUM CHLORIDE 9 MG/ML
75 INJECTION, SOLUTION INTRAVENOUS CONTINUOUS
Status: DISCONTINUED | OUTPATIENT
Start: 2024-08-14 | End: 2024-08-15 | Stop reason: HOSPADM

## 2024-08-14 RX ORDER — ATORVASTATIN CALCIUM 10 MG/1
10 TABLET, FILM COATED ORAL DAILY
COMMUNITY

## 2024-08-14 RX ORDER — NALOXONE HCL 0.4 MG/ML
0.04 VIAL (ML) INJECTION AS NEEDED
Status: DISCONTINUED | OUTPATIENT
Start: 2024-08-14 | End: 2024-08-14 | Stop reason: HOSPADM

## 2024-08-14 RX ORDER — PREGABALIN 50 MG/1
50 CAPSULE ORAL 3 TIMES DAILY
Status: ON HOLD | COMMUNITY
End: 2024-08-14

## 2024-08-14 RX ORDER — ROCURONIUM BROMIDE 10 MG/ML
INJECTION, SOLUTION INTRAVENOUS AS NEEDED
Status: DISCONTINUED | OUTPATIENT
Start: 2024-08-14 | End: 2024-08-14 | Stop reason: SURG

## 2024-08-14 RX ORDER — ACETAMINOPHEN 160 MG/5ML
650 SOLUTION ORAL EVERY 4 HOURS PRN
Status: DISCONTINUED | OUTPATIENT
Start: 2024-08-14 | End: 2024-08-15 | Stop reason: HOSPADM

## 2024-08-14 RX ORDER — ONDANSETRON 2 MG/ML
INJECTION INTRAMUSCULAR; INTRAVENOUS AS NEEDED
Status: DISCONTINUED | OUTPATIENT
Start: 2024-08-14 | End: 2024-08-14 | Stop reason: SURG

## 2024-08-14 RX ADMIN — SODIUM CHLORIDE, POTASSIUM CHLORIDE, SODIUM LACTATE AND CALCIUM CHLORIDE 1000 ML: 600; 310; 30; 20 INJECTION, SOLUTION INTRAVENOUS at 09:32

## 2024-08-14 RX ADMIN — MIDAZOLAM HYDROCHLORIDE 1 MG: 1 INJECTION, SOLUTION INTRAMUSCULAR; INTRAVENOUS at 11:18

## 2024-08-14 RX ADMIN — DEXAMETHASONE SODIUM PHOSPHATE 4 MG: 4 INJECTION, SOLUTION INTRA-ARTICULAR; INTRALESIONAL; INTRAMUSCULAR; INTRAVENOUS; SOFT TISSUE at 10:35

## 2024-08-14 RX ADMIN — HYDROMORPHONE HYDROCHLORIDE 0.5 MG: 1 INJECTION, SOLUTION INTRAMUSCULAR; INTRAVENOUS; SUBCUTANEOUS at 14:54

## 2024-08-14 RX ADMIN — Medication 10 MG: at 13:14

## 2024-08-14 RX ADMIN — SCOPALAMINE 1 PATCH: 1 PATCH, EXTENDED RELEASE TRANSDERMAL at 10:35

## 2024-08-14 RX ADMIN — LEVOTHYROXINE SODIUM 50 MCG: 50 TABLET ORAL at 17:47

## 2024-08-14 RX ADMIN — OXYCODONE AND ACETAMINOPHEN 1 TABLET: 10; 325 TABLET ORAL at 15:06

## 2024-08-14 RX ADMIN — Medication 10 MG: at 13:33

## 2024-08-14 RX ADMIN — Medication 120 MG: at 11:45

## 2024-08-14 RX ADMIN — HYDROCODONE BITARTRATE AND ACETAMINOPHEN 1 TABLET: 5; 325 TABLET ORAL at 20:11

## 2024-08-14 RX ADMIN — FENTANYL CITRATE 50 MCG: 50 INJECTION, SOLUTION INTRAMUSCULAR; INTRAVENOUS at 12:59

## 2024-08-14 RX ADMIN — Medication 10 MG: at 12:29

## 2024-08-14 RX ADMIN — Medication 10 MG: at 12:00

## 2024-08-14 RX ADMIN — FENTANYL CITRATE 50 MCG: 50 INJECTION, SOLUTION INTRAMUSCULAR; INTRAVENOUS at 13:15

## 2024-08-14 RX ADMIN — CEFAZOLIN 2000 MG: 2 INJECTION, POWDER, FOR SOLUTION INTRAMUSCULAR; INTRAVENOUS at 20:11

## 2024-08-14 RX ADMIN — HYDROMORPHONE HYDROCHLORIDE 0.5 MG: 1 INJECTION, SOLUTION INTRAMUSCULAR; INTRAVENOUS; SUBCUTANEOUS at 14:44

## 2024-08-14 RX ADMIN — ONDANSETRON 4 MG: 2 INJECTION INTRAMUSCULAR; INTRAVENOUS at 14:03

## 2024-08-14 RX ADMIN — DOCUSATE SODIUM 50 MG AND SENNOSIDES 8.6 MG 2 TABLET: 8.6; 5 TABLET, FILM COATED ORAL at 20:11

## 2024-08-14 RX ADMIN — FENTANYL CITRATE 100 MCG: 50 INJECTION, SOLUTION INTRAMUSCULAR; INTRAVENOUS at 12:02

## 2024-08-14 RX ADMIN — Medication 10 MG: at 12:56

## 2024-08-14 RX ADMIN — ROCURONIUM 10 MG: 50 INJECTION, SOLUTION INTRAVENOUS at 11:45

## 2024-08-14 RX ADMIN — FENTANYL CITRATE 50 MCG: 50 INJECTION, SOLUTION INTRAMUSCULAR; INTRAVENOUS at 12:18

## 2024-08-14 RX ADMIN — FENTANYL CITRATE 100 MCG: 50 INJECTION, SOLUTION INTRAMUSCULAR; INTRAVENOUS at 11:45

## 2024-08-14 RX ADMIN — CEFAZOLIN 2000 MG: 2 INJECTION, POWDER, FOR SOLUTION INTRAMUSCULAR; INTRAVENOUS at 12:00

## 2024-08-14 RX ADMIN — GLYCOPYRROLATE 0.2 MG: 0.2 INJECTION INTRAMUSCULAR; INTRAVENOUS at 12:00

## 2024-08-14 RX ADMIN — PROPOFOL INJECTABLE EMULSION 150 MG: 10 INJECTION, EMULSION INTRAVENOUS at 11:45

## 2024-08-14 RX ADMIN — ATORVASTATIN CALCIUM 10 MG: 10 TABLET, FILM COATED ORAL at 20:11

## 2024-08-14 RX ADMIN — SODIUM CHLORIDE 75 ML/HR: 9 INJECTION, SOLUTION INTRAVENOUS at 16:00

## 2024-08-14 RX ADMIN — CYCLOBENZAPRINE HYDROCHLORIDE 10 MG: 10 TABLET, FILM COATED ORAL at 20:11

## 2024-08-14 RX ADMIN — LOSARTAN POTASSIUM 50 MG: 50 TABLET, FILM COATED ORAL at 20:11

## 2024-08-14 RX ADMIN — DEXAMETHASONE SODIUM PHOSPHATE 8 MG: 4 INJECTION INTRA-ARTICULAR; INTRALESIONAL; INTRAMUSCULAR; INTRAVENOUS; SOFT TISSUE at 12:06

## 2024-08-14 RX ADMIN — PROPOFOL INJECTABLE EMULSION 150 MCG/KG/MIN: 10 INJECTION, EMULSION INTRAVENOUS at 11:50

## 2024-08-14 NOTE — ANESTHESIA POSTPROCEDURE EVALUATION
Patient: Jessica Allison    Procedure Summary       Date: 08/14/24 Room / Location:  [facility]  /  [facility]    Anesthesia Start: 1142 Anesthesia Stop: 1420    Procedure: CERVICAL DISCECTOMY ANTERIOR WITH FUSION C3-4,4-5,5-6 (Spine Cervical) Diagnosis:       Degeneration of cervical intervertebral disc      Spinal stenosis in cervical region      Cervical radiculopathy      (Degeneration of cervical intervertebral disc [M50.30])      (Spinal stenosis in cervical region [M48.02])      (Cervical radiculopathy [M54.12])    Surgeons: Adama Alcaraz MD Provider: David Peña CRNA    Anesthesia Type: general ASA Status: 2            Anesthesia Type: general    Vitals  Vitals Value Taken Time   /93 08/14/24 1522   Temp 98.4 °F (36.9 °C) 08/14/24 1515   Pulse 76 08/14/24 1522   Resp 15 08/14/24 1522   SpO2 95 % 08/14/24 1522           Post Anesthesia Care and Evaluation      Comments: Discharged per PACU Criteria

## 2024-08-14 NOTE — PLAN OF CARE
Goal Outcome Evaluation:  Plan of Care Reviewed With: patient, spouse   Pt A&Ox4, RA, VSS. Anterior neck incision with surgical dressing clean, dry, and intact. Aspen collar in place. Pt swallowing soft foods, pills whole, and thin liquids without difficulty or complaint. IVF infusing to R AC. F/C in place draining clear yellow urine.  at bedside, call light within reach.

## 2024-08-14 NOTE — OP NOTE
Procedure Note  Preop Diagnosis: Degeneration of cervical intervertebral disc [M50.30]  Spinal stenosis in cervical region [M48.02]  Cervical radiculopathy [M54.12]    Post-Op Diagnosis Codes:     * Degeneration of cervical intervertebral disc [M50.30]     * Spinal stenosis in cervical region [M48.02]     * Cervical radiculopathy [M54.12]     Procedure Name:C3/4, C4/5, and C5/6 anterior discectomy  C3/4, C4/5, and C5/6 anterior interbody fusion with allograft  C3/4, C4/5, and C5/6 partial corpectomies less than 50%  C3/4, C4/5, and C5/6 anterior instrumented fusion  Using the operative microscope    Spinal Surgery Levels Completed:3 Levels      Indications:  A MRI revealed findings of Cervical Degenerative Disc Disease . The patient now presents for anterior cervical discectomy and fusion of C3/4, C4/5, and C5/6 after discussing therapeutic alternatives.          Surgeon: Adama Alcaraz MD     Assistants: none    Anesthesia: General endotracheal anesthesia    ASA Class: 3    Procedure Details   After obtaining informed consent, having the risks and benefits of the procedure explained including but not limited to infection, bleeding, paralysis, spinal fluid leak, speech and swallow difficulty, stroke, coma, and death, the patient was brought to the operating room.  The patient was given general anesthesia via an endotracheal tube.  The patient was laid supine on the operating table.  The patient was placed in an occipital mandibular head harness and 5 pounds of axial traction.  A preplanned incision in the right anterior portion of the neck was marked with indelible marker.  Fluoroscopy confirmed the correct level of C3/4, C4/5, and C5/6.  The patient was then prepped and draped in a standard sterile fashion.  The preplanned incision was infiltrated with Marcaine and epinephrine.  A 10 blade scalpel was used to make an incision to the dermis and epidermis.  Bovie cautery was used to extend the incision down to the  subcutaneous and soft tissues to the level of the platysma.  The platysma was then split longitudinally using Metzenbaum scissors.  The connective tissue plane lateral to the esophagus and trachea medial to the carotid artery was then developed using blunt and sharp dissection.  Once the anterior portion of the vertebral bodies were identified a bent spinal needle was placed into the disc space at C3/4, C4/5, and C5/6.  Fluoroscopy confirmed this was the correct level.  The longus colli were then dissected off the anterior lateral portions of the vertebral bodies using Bovie cautery.  The Shadow-Line retractor system was then placed into the wound.  At this point the operative microscope was brought in.  Discectomies at each level were then conducted using a 15 blade scalpel to incise the annulus then a series of up-biting curettes, pituitary rongeurs and a 5 mm barrel bit were used.  Once the discectomy was completed the posterior longitudinal ligament at each level was identified. It was bluntly dissected using a nerve hook.  The PLL was then incised using a 1 mm Kerrison.  Bilateral foraminotomies at each level were then conducted using a 1.5 mm Kerrison.  Partial corpectomies of the posterior portion of the vertebral bodies of C3/4, C4/5, and C5/6 were then done using a 2 mm Kerrison.  Once this was completed a 7 mm peek interbody spacer was tapped into place at each level.  The spacers were filled with allograft.  An anterior cervical plate was then sized and placed over the anterior portions of the vertebral bodies and a series of 15 mm and 13 mm self drilling self-tapping screws were placed into the vertebral bodies.  Final AP and lateral fluoroscopy showed good position of all interbody devices and hardware.  The wound was then copiously irrigated with antibiotic solution.  The wound was inspected for hemostasis.  The subcutaneous tissues were then closed using a series of inverted interrupted 2-0 Vicryl  sutures.  The skin was closed using running 4-0 Monocryl subcuticular.  All sponge, needle and instrument counts were correct at the end of the procedure.  The patient was extubated in stable condition and returned to recovery room with about 60 mL of blood loss.        Findings:  Cervical Degenerative Disc Disease    Estimated Blood Loss:   60           Drains: none           Total IV Fluids: ml           Specimens:            Implants:   Implant Name Type Inv. Item Serial No.  Lot No. LRB No. Used Action   KT HEMOST ABS SURGIFOAM PORCN 1GRAM - [id number] Implant KT HEMOST ABS SURGIFOAM PORCN 1GRAM  [employer]  DIV [employer] J AND J [id number] N/A 1 Implanted   PUTTY DBM DAVID 2.5CC - [id number] - [id number] Implant PUTTY DBM DAVID 2.5CC [id number] [employer]  N/A 1 Implanted   CAGE CERV ANATOMIC PTC [id number] - [id number] Implant CAGE CERV ANATOMIC PTC [id number]  [employer] 32MY N/A 1 Implanted   KT HEMOST ABS SURGIFOAM PORCN 1GRAM - [id number] Implant KT HEMOST ABS SURGIFOAM PORCN 1GRAM  [employer]  DIV OF J AND J [id number] N/A 1 Implanted   CAGE CERV ANATOMIC PTC [id number] - [id number] Implant CAGE CERV ANATOMIC PTC [id number]  [employer] 75NU N/A 2 Implanted   PLT ACP ZEVO 3LVL 51MM NS - [id number] Implant PLT ACP ZEVO 3LVL 51MM NS  [employer]  N/A 1 Implanted   SCRW ZEVO 2THRD SD VA 3.5X15MM - [id number] Implant SCRW ZEVO 2THRD SD VA 3.5X15MM  [employer]  N/A 4 Implanted   SCRW ZEVO BRANDI SD 3.5X13MM - [id number] Implant SCRW ZEVO BRANDI SD 3.5X13MM  [employer]  N/A 4 Implanted              Complications:  none           Disposition: PACU - hemodynamically stable.           Condition: stable        Adama Alcaraz MD

## 2024-08-14 NOTE — H&P
SUBJECTIVE:  Patient ID: Jessica Allison is a 63 y.o. female is here today for follow-up.     Chief Complaint: Neck pain       Chief Complaint   Patient presents with    Results       EMG 4/17/24, MR 3/26/24  Patient with neck pain & LUE pain with N/T LUE (elbow to hand).    She has completed 8 visits of therapy @ [facility] without lasting relief of her symptoms.  She has also had 2 injections with [facility] without relief of her symptoms.         HPI  63-year-old female [place] following for complaints of neck pain, headaches left trapezius and scapular pain.  And then she also complains of numbness and tingling in an ulnar distribution in the left hand from the elbow to the fourth and fifth fingers.  She is done a dedicated course of physical therapy without any meaningful improvement.  She has had 2 injections from [facility] without any meaningful improvement.  She is also worked with a chiropractor.  She takes gabapentin and has tried anti-inflammatory medications.     The following portions of the patient's history were reviewed and updated as appropriate: allergies, current medications, past family history, past medical history, past social history, past surgical history and problem list.     OBJECTIVE:     Review of Systems   Musculoskeletal:  Positive for neck pain and neck stiffness.   Neurological:  Positive for numbness.   All other systems reviewed and are negative.           Physical Exam  Eyes:      Extraocular Movements: EOM normal.      Pupils: Pupils are equal, round, and reactive to light.   Neurological:      Mental Status: She is oriented to person, place, and time.      Motor: Motor strength is normal.     Coordination: Finger-Nose-Finger Test normal.      Gait: Gait is intact.   Psychiatric:         Speech: Speech normal.            Neurologic Exam      Mental Status   Oriented to person, place, and time.   Attention: normal.   Speech: speech is normal   Level of consciousness:  alert  Knowledge: good.      Cranial Nerves      CN II   Visual fields full to confrontation.      CN III, IV, VI   Pupils are equal, round, and reactive to light.  Extraocular motions are normal.      CN V   Facial sensation intact.      CN VII   Facial expression full, symmetric.      CN VIII   CN VIII normal.      CN IX, X   CN IX normal.   CN X normal.      CN XI   CN XI normal.      CN XII   CN XII normal.      Motor Exam   Muscle bulk: normal  Overall muscle tone: normal  Right arm pronator drift: absent  Left arm pronator drift: absent     Strength   Strength 5/5 throughout. Some wasting in the left hand consistent with an ulnar neuropathy.      Sensory Exam   Light touch normal.   Pinprick normal.      Gait, Coordination, and Reflexes      Gait  Gait: normal     Coordination   Finger to nose coordination: normal     Tremor   Resting tremor: absent  Intention tremor: absent  Action tremor: absent     Reflexes   Reflexes 2+ except as noted.         Independent Review of Radiographic Studies:        ASSESSMENT/PLAN:  Cervical degenerative disc disease.  The patient has notable degenerative disc disease at C3-4, 4 5, 5 6.  There is also notable foraminal stenosis at C4-5 and 5 6.  There is a loss of lordosis.  I think these findings are responsible for her neck pain, headaches and left shoulder and scapular pain.  She is gone through an extensive course of nonsurgical care for these issues.  This is included physical therapy, chiropractic care, injection treatments, medical management.  At this point I would recommend a three-level anterior cervical fusion involving C3-4, 4 5, 5 6.  Risk and benefits of the procedure were discussed at length which included but were not limited to infection, bleeding, paralysis, spinal fluid leak, speech and swallow difficulty, stroke, coma, and death.  Patient acknowledged understanding this.  Her questions and concerns were addressed.  Ulnar neuropathy.  The patient do think  also has ulnar neuropathy.  The EMG nerve conduction study was consistent with ulnar neuropathy versus a chronic C8 radiculopathy.  There is notable left foraminal stenosis not acknowledged by the radiologist at C7-T1 on the left.  I explained to the patient and her  very clearly that the planned neck surgery may not address the numbness and tingling in her left hand.  And this may be due to a combination of both cervical nerve compression at C7-T1 and an ulnar neuropathy.  We can reevaluate the left hand symptoms after her neck surgery is completed.  She acknowledged understand this.  Her questions and concerns were addressed.        1. Degeneration of cervical intervertebral disc    2. Spinal stenosis in cervical region    3. Cervical radiculopathy    4. Ulnar neuropathy at elbow of left upper extremity    5. Left arm numbness    6. Non-smoker    7. BMI 21.0-21.9, adult          The patient's Body mass index is 21.71 kg/m².. BMI is within normal parameters. No follow-up required.     Return for POSTOPERATIVELY.        Adama Alcaraz MD

## 2024-08-14 NOTE — NURSING NOTE
Pt arrived to floor from PACU. Pt is oriented, but groggy and continues to falls asleep. Moderate  bilaterally, moderate flexion bilaterally. Anterior neck incision covered with surgical dressing. Trachea midline, aspen collar in place. VSS, SCD's in place. F/c in place draining clear yellow urine into collection bag.

## 2024-08-14 NOTE — ANESTHESIA PROCEDURE NOTES
Airway  Urgency: elective    Date/Time: 8/14/2024 11:46 AM  Airway not difficult    General Information and Staff    Patient location during procedure: OR  CRNA/CAA: David Peña CRNA    Indications and Patient Condition  Indications for airway management: airway protection    Preoxygenated: yes  Mask difficulty assessment: 1 - vent by mask    Final Airway Details  Final airway type: endotracheal airway      Successful airway: ETT and NIM tube  Cuffed: yes   Successful intubation technique: video laryngoscopy  Endotracheal tube insertion site: oral  Blade: Starkey  Blade size: 2  ETT size (mm): 7.0  Cormack-Lehane Classification: grade I - full view of glottis  Placement verified by: capnometry   Measured from: lips  ETT/EBT  to lips (cm): 21  Number of attempts at approach: 1  Assessment: lips, teeth, and gum same as pre-op and atraumatic intubation

## 2024-08-14 NOTE — ANESTHESIA PREPROCEDURE EVALUATION
Anesthesia Evaluation     Patient summary reviewed   no history of anesthetic complications:   NPO Solid Status: > 8 hours             Airway   Mallampati: II  Dental      Pulmonary    (-) COPD, asthma, sleep apnea, not a smoker  Cardiovascular   Exercise tolerance: excellent (>7 METS)    (+) hypertension, hyperlipidemia  (-) pacemaker, past MI, angina, cardiac stents      Neuro/Psych  (-) seizures, TIA, CVA  GI/Hepatic/Renal/Endo    (+) thyroid problem hypothyroidism  (-) GERD, liver disease, no renal disease, diabetes    Musculoskeletal     Abdominal    Substance History      OB/GYN          Other                    Anesthesia Plan    ASA 2     general     intravenous induction     Anesthetic plan, risks, benefits, and alternatives have been provided, discussed and informed consent has been obtained with: patient.    CODE STATUS:

## 2024-08-15 VITALS
OXYGEN SATURATION: 98 % | DIASTOLIC BLOOD PRESSURE: 68 MMHG | BODY MASS INDEX: 22.35 KG/M2 | TEMPERATURE: 98.4 F | HEIGHT: 68 IN | SYSTOLIC BLOOD PRESSURE: 134 MMHG | HEART RATE: 62 BPM | RESPIRATION RATE: 18 BRPM | WEIGHT: 147.5 LBS

## 2024-08-15 LAB
ANION GAP SERPL CALCULATED.3IONS-SCNC: 8 MMOL/L (ref 5–15)
BASOPHILS # BLD AUTO: 0.03 10*3/MM3 (ref 0–0.2)
BASOPHILS NFR BLD AUTO: 0.4 % (ref 0–1.5)
BUN SERPL-MCNC: 17 MG/DL (ref 8–23)
BUN/CREAT SERPL: 25 (ref 7–25)
CALCIUM SPEC-SCNC: 8.8 MG/DL (ref 8.6–10.5)
CHLORIDE SERPL-SCNC: 101 MMOL/L (ref 98–107)
CO2 SERPL-SCNC: 27 MMOL/L (ref 22–29)
CREAT SERPL-MCNC: 0.68 MG/DL (ref 0.57–1)
CYTO UR: NORMAL
DEPRECATED RDW RBC AUTO: 42.4 FL (ref 37–54)
EGFRCR SERPLBLD CKD-EPI 2021: 96.8 ML/MIN/1.73
EOSINOPHIL # BLD AUTO: 0 10*3/MM3 (ref 0–0.4)
EOSINOPHIL NFR BLD AUTO: 0 % (ref 0.3–6.2)
ERYTHROCYTE [DISTWIDTH] IN BLOOD BY AUTOMATED COUNT: 12.1 % (ref 12.3–15.4)
GLUCOSE SERPL-MCNC: 102 MG/DL (ref 65–99)
HCT VFR BLD AUTO: 34.6 % (ref 34–46.6)
HGB BLD-MCNC: 11.3 G/DL (ref 12–15.9)
IMM GRANULOCYTES # BLD AUTO: 0.03 10*3/MM3 (ref 0–0.05)
IMM GRANULOCYTES NFR BLD AUTO: 0.4 % (ref 0–0.5)
LAB AP CASE REPORT: NORMAL
LYMPHOCYTES # BLD AUTO: 1.66 10*3/MM3 (ref 0.7–3.1)
LYMPHOCYTES NFR BLD AUTO: 19.4 % (ref 19.6–45.3)
Lab: NORMAL
MCH RBC QN AUTO: 31 PG (ref 26.6–33)
MCHC RBC AUTO-ENTMCNC: 32.7 G/DL (ref 31.5–35.7)
MCV RBC AUTO: 94.8 FL (ref 79–97)
MONOCYTES # BLD AUTO: 1.2 10*3/MM3 (ref 0.1–0.9)
MONOCYTES NFR BLD AUTO: 14 % (ref 5–12)
NEUTROPHILS NFR BLD AUTO: 5.64 10*3/MM3 (ref 1.7–7)
NEUTROPHILS NFR BLD AUTO: 65.8 % (ref 42.7–76)
NRBC BLD AUTO-RTO: 0 /100 WBC (ref 0–0.2)
PATH REPORT.FINAL DX SPEC: NORMAL
PATH REPORT.GROSS SPEC: NORMAL
PLATELET # BLD AUTO: 247 10*3/MM3 (ref 140–450)
PMV BLD AUTO: 9.6 FL (ref 6–12)
POTASSIUM SERPL-SCNC: 4 MMOL/L (ref 3.5–5.2)
RBC # BLD AUTO: 3.65 10*6/MM3 (ref 3.77–5.28)
SODIUM SERPL-SCNC: 136 MMOL/L (ref 136–145)
WBC NRBC COR # BLD AUTO: 8.56 10*3/MM3 (ref 3.4–10.8)

## 2024-08-15 PROCEDURE — 85025 COMPLETE CBC W/AUTO DIFF WBC: CPT | Performed by: NURSE PRACTITIONER

## 2024-08-15 PROCEDURE — 97165 OT EVAL LOW COMPLEX 30 MIN: CPT

## 2024-08-15 PROCEDURE — 25010000002 CEFAZOLIN PER 500 MG: Performed by: NURSE PRACTITIONER

## 2024-08-15 PROCEDURE — 80048 BASIC METABOLIC PNL TOTAL CA: CPT | Performed by: NURSE PRACTITIONER

## 2024-08-15 PROCEDURE — 25810000003 SODIUM CHLORIDE 0.9 % SOLUTION: Performed by: NURSE PRACTITIONER

## 2024-08-15 PROCEDURE — 97161 PT EVAL LOW COMPLEX 20 MIN: CPT | Performed by: PHYSICAL THERAPIST

## 2024-08-15 PROCEDURE — 99024 POSTOP FOLLOW-UP VISIT: CPT | Performed by: NURSE PRACTITIONER

## 2024-08-15 RX ORDER — CYCLOBENZAPRINE HCL 10 MG
10 TABLET ORAL 3 TIMES DAILY PRN
Qty: 45 TABLET | Refills: 0 | Status: SHIPPED | OUTPATIENT
Start: 2024-08-15

## 2024-08-15 RX ORDER — HYDROCODONE BITARTRATE AND ACETAMINOPHEN 5; 325 MG/1; MG/1
1 TABLET ORAL EVERY 6 HOURS PRN
Qty: 24 TABLET | Refills: 0 | Status: SHIPPED | OUTPATIENT
Start: 2024-08-15 | End: 2024-08-21

## 2024-08-15 RX ADMIN — LOSARTAN POTASSIUM 50 MG: 50 TABLET, FILM COATED ORAL at 09:02

## 2024-08-15 RX ADMIN — CYCLOBENZAPRINE HYDROCHLORIDE 10 MG: 10 TABLET, FILM COATED ORAL at 13:50

## 2024-08-15 RX ADMIN — HYDROCODONE BITARTRATE AND ACETAMINOPHEN 1 TABLET: 5; 325 TABLET ORAL at 00:03

## 2024-08-15 RX ADMIN — HYDROCODONE BITARTRATE AND ACETAMINOPHEN 1 TABLET: 5; 325 TABLET ORAL at 04:59

## 2024-08-15 RX ADMIN — CYCLOBENZAPRINE HYDROCHLORIDE 10 MG: 10 TABLET, FILM COATED ORAL at 04:59

## 2024-08-15 RX ADMIN — Medication 3 ML: at 09:02

## 2024-08-15 RX ADMIN — HYDROCODONE BITARTRATE AND ACETAMINOPHEN 1 TABLET: 5; 325 TABLET ORAL at 13:50

## 2024-08-15 RX ADMIN — LEVOTHYROXINE SODIUM 50 MCG: 50 TABLET ORAL at 06:02

## 2024-08-15 RX ADMIN — DOCUSATE SODIUM 50 MG AND SENNOSIDES 8.6 MG 2 TABLET: 8.6; 5 TABLET, FILM COATED ORAL at 09:04

## 2024-08-15 RX ADMIN — SODIUM CHLORIDE 75 ML/HR: 9 INJECTION, SOLUTION INTRAVENOUS at 06:00

## 2024-08-15 RX ADMIN — HYDROCODONE BITARTRATE AND ACETAMINOPHEN 1 TABLET: 5; 325 TABLET ORAL at 09:02

## 2024-08-15 RX ADMIN — CEFAZOLIN 2000 MG: 2 INJECTION, POWDER, FOR SOLUTION INTRAMUSCULAR; INTRAVENOUS at 04:53

## 2024-08-15 RX ADMIN — HYDROCODONE BITARTRATE AND ACETAMINOPHEN 1 TABLET: 5; 325 TABLET ORAL at 18:09

## 2024-08-15 NOTE — DISCHARGE SUMMARY
Date of Discharge:  8/15/2024    Discharge Diagnosis: Degeneration of cervical intervertebral disc [M50.30]  Spinal stenosis in cervical region [M48.02]  Cervical radiculopathy [M54.12]  Cervical spinal stenosis [M48.02]    Presenting Problem/History of Present Illness  Degeneration of cervical intervertebral disc [M50.30]  Spinal stenosis in cervical region [M48.02]  Cervical radiculopathy [M54.12]  Cervical spinal stenosis [M48.02]       Hospital Course  Patient is a 64 y.o. female presented with Degeneration of cervical intervertebral disc [M50.30]  Spinal stenosis in cervical region [M48.02]  Cervical radiculopathy [M54.12]  Cervical spinal stenosis [M48.02].  The patient has been through all manner of conservative care without any meaningful improvement. The patient went to the operating room on August 14, 2024 for a C 3-4, 4-5, 5-6 ACDF the patient tolerated procedure well.  Currently the patient is ambulating.  The patient is tolerating p.o.  The patient is voiding spontaneously.  It is felt that at this time the patient is stable and suitable to be discharged home.  See orders for discharge instructions and restrictions.  The patient can take the dressing off in 72 hours and can get the wound wet at that time.  The patient is to clean the wound daily with soap and water.  They are to call the office with any drainage from the incision or worsening pain.  She is to wear the cervical collar at all times.  She is not to take any NSAIDs.  I will follow-up with her in the office in 3 weeks.    Procedures Performed  Procedure(s):  CERVICAL DISCECTOMY ANTERIOR WITH FUSION C3-4,4-5,5-6       Consults:   Consults       No orders found for last 30 day(s).              Condition on Discharge: Stable    Vital Signs  Temp:  [97.7 °F (36.5 °C)-98.4 °F (36.9 °C)] 98.4 °F (36.9 °C)  Heart Rate:  [58-81] 62  Resp:  [16-18] 18  BP: (129-152)/(68-89) 134/68    Physical Exam:   Physical Exam  Constitutional:       General:  She is awake.      Appearance: She is well-developed.   HENT:      Head: Normocephalic.   Eyes:      Extraocular Movements: Extraocular movements intact.      Pupils: Pupils are equal, round, and reactive to light.   Pulmonary:      Effort: Pulmonary effort is normal.   Musculoskeletal:         General: Normal range of motion.      Cervical back: Normal range of motion.   Skin:     General: Skin is warm.      Comments: Incision clean dry and intact   Neurological:      Mental Status: She is alert and oriented to person, place, and time.      GCS: GCS eye subscore is 4. GCS verbal subscore is 5. GCS motor subscore is 6.      Cranial Nerves: No cranial nerve deficit.      Sensory: No sensory deficit.      Motor: Motor strength is normal.     Gait: Gait normal.      Deep Tendon Reflexes: Reflexes are normal and symmetric.   Psychiatric:         Speech: Speech normal.         Behavior: Behavior normal.         Thought Content: Thought content normal.          Neurological Exam  Mental Status  Awake and alert. Oriented to person, place and time. Oriented to person, place, and time. Speech is normal. Language is fluent with no aphasia. Attention and concentration are normal.    Cranial Nerves  CN I: Sense of smell is normal.  CN II: Right normal visual field. Left normal visual field.  CN III, IV, VI: Extraocular movements intact bilaterally. Pupils equal round and reactive to light bilaterally.  CN V: Facial sensation is normal.  CN VII:  Right: There is no facial weakness.  Left: There is no facial weakness.  CN XI: Shoulder shrug strength is normal.  CN XII: Tongue midline without atrophy or fasciculations.    Motor  Normal muscle bulk throughout. Normal muscle tone. Strength is 5/5 throughout all four extremities.    Sensory  Sensation is intact to light touch, pinprick, vibration and proprioception in all four extremities.    Reflexes  Deep tendon reflexes are 2+ and symmetric in all four extremities.    Gait  Normal  casual, toe, heel and tandem gait. Normal gait.         Discharge Disposition  Home or Self Care    Discharge Medications     Discharge Medications        New Medications        Instructions Start Date   cyclobenzaprine 10 MG tablet  Commonly known as: FLEXERIL   10 mg, Oral, 3 Times Daily PRN      HYDROcodone-acetaminophen 5-325 MG per tablet  Commonly known as: NORCO   1 tablet, Oral, Every 6 Hours PRN             Continue These Medications        Instructions Start Date   atorvastatin 10 MG tablet  Commonly known as: LIPITOR   10 mg, Oral, Daily      CALCIUM-VITAMIN D3 PO   1 tablet, Oral, Daily      levothyroxine 50 MCG tablet  Commonly known as: SYNTHROID, LEVOTHROID   50 mcg, Oral, Daily      losartan 50 MG tablet  Commonly known as: COZAAR   50 mg, Oral, 2 Times Daily      vitamin B-12 100 MCG tablet  Commonly known as: CYANOCOBALAMIN   50 mcg, Oral, Daily             Stop These Medications      diclofenac 75 MG EC tablet  Commonly known as: VOLTAREN              Discharge Diet:   Diet Instructions       Diet: Regular/House Diet; Regular Texture (IDDSI 7); Thin (IDDSI 0)      Discharge Diet: Regular/House Diet    Texture: Regular Texture (IDDSI 7)    Fluid Consistency: Thin (IDDSI 0)            Activity at Discharge:   Activity Instructions       Other Instructions (Specify)      Activity Instructions: No strenuous activity, no driving, wear cervical collar at all times.  No NSAIDs            Follow-up Appointments  Future Appointments   Date Time Provider Department Center   9/4/2024 10:30 AM Lester Lopez APRN [facility]   11/5/2024  3:45 PM Adama Alcaraz MD [facility]     Additional Instructions for the Follow-ups that You Need to Schedule       Call MD With Problems / Concerns   As directed      Instructions: Worsening neck or arm pain, drainage from wound, fever, difficulty breathing or swallowing.    Order Comments: Instructions: Worsening neck or arm pain, drainage from wound, fever,  difficulty breathing or swallowing.         Discharge Follow-up with Specialty: leonardo lopez np; 3 Weeks   As directed      Specialty: leonardo lopez np   Follow Up: 3 Weeks                Test Results Pending at Discharge       Leonardo Lopez, GARRET  08/15/24  16:23 CDT    Time: Discharge 20 min

## 2024-08-15 NOTE — PLAN OF CARE
Goal Outcome Evaluation:  Plan of Care Reviewed With: patient, spouse   Pt A&Ox4, RA, VSS. Up x1 assist. Pt has ambulated in hallway and to bathroom today. Anterior incision dressing monitored, and is c/d/I/. Aspen collar in place. PRN pain meds and flexeril given for c/o neck pain. Pt reports relief.  at bedside, call light within reach.

## 2024-08-15 NOTE — THERAPY DISCHARGE NOTE
Patient Name: Jessica Allison  : 1960    MRN: [id number]                              Today's Date: 8/15/2024       Admit Date: 2024    Visit Dx:     ICD-10-CM ICD-9-CM   1. Degeneration of cervical intervertebral disc  M50.30 722.4   2. Spinal stenosis in cervical region  M48.02 723.0   3. Cervical radiculopathy  M54.12 723.4     Patient Active Problem List   Diagnosis    Stress incontinence in female    H/O hysterectomy for benign disease    Menopausal state    Acquired hypothyroidism    Degeneration of cervical intervertebral disc    Hypertension, essential, benign    Hypertensive disorder    Pure hypercholesterolemia    Vitamin B12 deficiency    Cervicalgia    Left arm numbness    Cervical radiculopathy    Body mass index (BMI) of 23.0-23.9 in adult    Nonsmoker    Spinal stenosis in cervical region    Cervical spinal stenosis     Past Medical History:   Diagnosis Date    Arthritis     Cervical disc disorder     CTS (carpal tunnel syndrome)     Disease of thyroid gland     Encounter for gynecological examination     Gynecologic examination       Headache     Hx of mammogram     Other screening mammogram     Hyperlipidemia     Hypertension     Low back pain     Lumbosacral disc disease     Screening for osteoporosis      Past Surgical History:   Procedure Laterality Date    ANTERIOR CERVICAL DISCECTOMY W/ FUSION N/A 2024    Procedure: CERVICAL DISCECTOMY ANTERIOR WITH FUSION C3-4,4-5,5-6;  Surgeon: Adama Alcaraz MD;  Location: [facility] OR;  Service: Neurosurgery;  Laterality: N/A;    BACK SURGERY      CARPAL TUNNEL RELEASE      COLONOSCOPY N/A 2019    Procedure: COLONOSCOPY;  Surgeon: Jono Palafox DO;  Location: [facility] ENDOSCOPY;  Service: Gastroenterology    EPIDURAL BLOCK      INJECTION OF MEDICATION  2011    Kenalog    LAPAROSCOPIC TOTAL HYSTERECTOMY      Laparoscopic total hysterectomylumb: TLH LSO secondary to symptomatic  fibroids, menorrhagia, pelvic pain    LUMBAR DISC SURGERY  1999    L5 discectomy    OOPHORECTOMY  1980    right secondary to torsion    PAP SMEAR  10/21/2010    negative    VAGINAL DELIVERY      x 2      General Information       Row Name 08/15/24 0744          Physical Therapy Time and Intention    Document Type evaluation  s/p ACDF C3-6 due to neck pain and L UE pain and numbness and tingling from elbow to hand Simultaneous filing. User may be unaware of other data.  -MS     Mode of Treatment physical therapy;co-treatment  Simultaneous filing. User may be unaware of other data.  -MS       Row Name 08/15/24 0744          General Information    Patient Profile Reviewed yes  Simultaneous filing. User may be unaware of other data.  -MS     Prior Level of Function independent:;all household mobility;community mobility;ADL's;driving  Simultaneous filing. User may be unaware of other data.  -MS     Existing Precautions/Restrictions cervical collar;brace on at all times;spinal  Simultaneous filing. User may be unaware of other data.  -MS     Barriers to Rehab none identified  -MS       Row Name 08/15/24 0744          Living Environment    People in Home spouse  Simultaneous filing. User may be unaware of other data.  -MS       Row Name 08/15/24 07          Home Main Entrance    Number of Stairs, Main Entrance one  Simultaneous filing. User may be unaware of other data.  -MS     Stair Railings, Main Entrance none  -MS       Row Name 08/15/24 0744          Stairs Within Home, Primary    Number of Stairs, Within Home, Primary none  Simultaneous filing. User may be unaware of other data.  -MS       Row Name 08/15/24 0744          Cognition    Orientation Status (Cognition) oriented x 4  Simultaneous filing. User may be unaware of other data.  -MS               User Key  (r) = Recorded By, (t) = Taken By, (c) = Cosigned By      Initials Name Provider Type    Roula James R, PT, DPT, NCS Physical Therapist                    Mobility       Row Name 08/15/24 44          Bed Mobility    Bed Mobility sidelying-sit  -MS     Rolling Right [place] (Bed Mobility) verbal cues;contact guard  -MS     Sidelying-Sit [place] (Bed Mobility) verbal cues;contact guard  -MS       Row Name 08/15/24 0744          Sit-Stand Transfer    Sit-Stand [place] (Transfers) verbal cues;standby assist  -MS       Row Name 08/15/24 44          Gait/Stairs (Locomotion)    [place] Level (Gait) standby assist  -MS     Distance in Feet (Gait) 200  -MS     Comment, (Gait/Stairs) guarded gait pattern, weaned from HHA to no UE support.  -MS               User Key  (r) = Recorded By, (t) = Taken By, (c) = Cosigned By      Initials Name Provider Type    Roula James, PT, DPT, NCS Physical Therapist                   Obj/Interventions       Row Name 08/15/24 44          Range of Motion Comprehensive    General Range of Motion bilateral upper extremity ROM WFL;bilateral lower extremity ROM WFL  -MS       Row Name 08/15/24 44          Strength Comprehensive (MMT)    Comment, General Manual Muscle Testing (MMT) Assessment grossly 5/5  -MS       [facility] Name 08/15/24 44          Balance    Balance Assessment sitting static balance;sitting dynamic balance;standing static balance;standing dynamic balance  -MS     Static Sitting Balance independent  -MS     Dynamic Sitting Balance independent  -MS     Position, Sitting Balance unsupported;sitting edge of bed  -MS     Static Standing Balance standby assist  -MS     Dynamic Standing Balance standby assist  -MS     Position/Device Used, Standing Balance supported;unsupported  weaned from HHA to no UE support  -MS       Row Name 08/15/24 44          Sensory Assessment (Somatosensory)    Sensory Assessment (Somatosensory) sensation intact  -MS               User Key  (r) = Recorded By, (t) = Taken By, (c) = Cosigned By      Initials Name Provider Type    Roula James, PT, DPT, NCS Physical  Therapist                   Goals/Plan    No documentation.                  Clinical Impression       Row Name 08/15/24 0744          Pain    Pretreatment Pain Rating 5/10  -MS     Posttreatment Pain Rating 4/10  -MS     Pain Location incisional  -MS     Pain Location - neck  -MS     Pre/Posttreatment Pain Comment and throat  -MS     Pain Intervention(s) Medication (See MAR);Repositioned;Ambulation/increased activity  -MS       Row Name 08/15/24 0744          Plan of Care Review    Plan of Care Reviewed With patient;spouse  -MS     Progress improving  -MS     Outcome Evaluation The patient presents alert and oriented x4 sitting up in bed with aspen collar in place. She demonstrates no focal neurological deficits in strength, sensation, or coordination. Her numbness prior to surgery has improved. She demonstrates ability to complete all functional mobility and was educated on proper use of aspen collar. She has no further needs for PT at this time. Recommend discharge home with assist.  -MS       Row Name 08/15/24 0744          Therapy Assessment/Plan (PT)    Criteria for Skilled Interventions Met (PT) no problems identified which require skilled intervention  -MS     Therapy Frequency (PT) evaluation only  -MS       Row Name 08/15/24 0744          Positioning and Restraints    Post Treatment Position chair  -MS     In Chair reclined;call light within reach;encouraged to call for assist;with family/caregiver;with brace  -MS               User Key  (r) = Recorded By, (t) = Taken By, (c) = Cosigned By      Initials Name Provider Type    Roula James R, PT, DPT, NCS Physical Therapist                   Outcome Measures       Row Name 08/15/24 0744          How much help from another person do you currently need...    Turning from your back to your side while in flat bed without using bedrails? 4  -MS     Moving to and from a bed to a chair (including a wheelchair)? 4  -MS     Standing up from a chair using your  arms (e.g., wheelchair, bedside chair)? 4  -MS     Climbing 3-5 steps with a railing? 3  -MS     To walk in hospital room? 4  -MS       Row Name 08/15/24 0744          Functional Assessment    Outcome Measure Options AM-PAC 6 Clicks Basic Mobility (PT)  -MS               User Key  (r) = Recorded By, (t) = Taken By, (c) = Cosigned By      Initials Name Provider Type    Roula James OLIVER, PT, DPT, NCS Physical Therapist                    PT Recommendation and Plan     Plan of Care Reviewed With: patient, spouse  Progress: improving  Outcome Evaluation: The patient presents alert and oriented x4 sitting up in bed with aspen collar in place. She demonstrates no focal neurological deficits in strength, sensation, or coordination. Her numbness prior to surgery has improved. She demonstrates ability to complete all functional mobility and was educated on proper use of aspen collar. She has no further needs for PT at this time. Recommend discharge home with assist.     Time Calculation:         PT Charges       Row Name 08/15/24 0744             Time Calculation    Start Time 0735  5 min chart review  -MS      Stop Time 0810  -MS      Time Calculation (min) 35 min  -MS      PT Received On 08/15/24  -MS         Untimed Charges    PT Eval/Re-eval Minutes 40  -MS         Total Minutes    Untimed Charges Total Minutes 40  -MS       Total Minutes 40  -MS                User Key  (r) = Recorded By, (t) = Taken By, (c) = Cosigned By      Initials Name Provider Type    MS Allison Roula BOYD, PT, DPT, NCS Physical Therapist                      PT G-Codes  Outcome Measure Options: AM-PAC 6 Clicks Basic Mobility (PT)  AM-PAC 6 Clicks Score (PT): 18    PT Discharge Summary  Anticipated Discharge Disposition (PT): home with assist    Roula Allison, PT, DPT, NCS  8/15/2024

## 2024-08-15 NOTE — PLAN OF CARE
Goal Outcome Evaluation:  Plan of Care Reviewed With: patient, spouse        Progress: improving  Outcome Evaluation: The patient presents alert and oriented x4 sitting up in bed with aspen collar in place. She demonstrates no focal neurological deficits in strength, sensation, or coordination. Her numbness prior to surgery has improved. She demonstrates ability to complete all functional mobility and was educated on proper use of aspen collar. She has no further needs for PT at this time. Recommend discharge home with assist.      Anticipated Discharge Disposition (PT): home with assist

## 2024-08-15 NOTE — THERAPY DISCHARGE NOTE
Acute Care - Occupational Therapy Initial Evaluation/Discharge  [facility]     Patient Name: Jessica Allison  : 1960  MRN: [id number]  Today's Date: 8/15/2024     Date of Referral to OT: 24         Admit Date: 2024       ICD-10-CM ICD-9-CM   1. Degeneration of cervical intervertebral disc  M50.30 722.4   2. Spinal stenosis in cervical region  M48.02 723.0   3. Cervical radiculopathy  M54.12 723.4     Patient Active Problem List   Diagnosis    Stress incontinence in female    H/O hysterectomy for benign disease    Menopausal state    Acquired hypothyroidism    Degeneration of cervical intervertebral disc    Hypertension, essential, benign    Hypertensive disorder    Pure hypercholesterolemia    Vitamin B12 deficiency    Cervicalgia    Left arm numbness    Cervical radiculopathy    Body mass index (BMI) of 23.0-23.9 in adult    Nonsmoker    Spinal stenosis in cervical region    Cervical spinal stenosis     Past Medical History:   Diagnosis Date    Arthritis     Cervical disc disorder     CTS (carpal tunnel syndrome)     Disease of thyroid gland     Encounter for gynecological examination     Gynecologic examination       Headache     Hx of mammogram     Other screening mammogram     Hyperlipidemia     Hypertension     Low back pain     Lumbosacral disc disease     Screening for osteoporosis      Past Surgical History:   Procedure Laterality Date    ANTERIOR CERVICAL DISCECTOMY W/ FUSION N/A 2024    Procedure: CERVICAL DISCECTOMY ANTERIOR WITH FUSION C3-4,4-5,5-6;  Surgeon: Adama Alcaraz MD;  Location: [facility] OR;  Service: Neurosurgery;  Laterality: N/A;    BACK SURGERY      CARPAL TUNNEL RELEASE      COLONOSCOPY N/A 2019    Procedure: COLONOSCOPY;  Surgeon: Jono Palafox DO;  Location: [facility] ENDOSCOPY;  Service: Gastroenterology    EPIDURAL BLOCK      INJECTION OF MEDICATION  2011    Kenalog    LAPAROSCOPIC TOTAL HYSTERECTOMY       Laparoscopic total hysterectomylumb: TLH LSO secondary to symptomatic fibroids, menorrhagia, pelvic pain    LUMBAR DISC SURGERY  1999    L5 discectomy    OOPHORECTOMY  1980    right secondary to torsion    PAP SMEAR  10/21/2010    negative    VAGINAL DELIVERY      x 2       OT ASSESSMENT FLOWSHEET (Last 12 Hours)       OT Evaluation and Treatment       Row Name 08/15/24 0744 08/15/24 0743                OT Time and Intention    Subjective Information --  -EC complains of;pain  -EC       Document Type -- evaluation  -EC       Mode of Treatment -- occupational therapy;co-treatment  -EC          General Information    Patient Profile Reviewed -- yes  -EC       Prior Level of Function -- independent:;all household mobility;community mobility;feeding;grooming;dressing;bathing;driving  -EC       Equipment Currently Used at Home --  -EC shower chair  -EC       Pertinent History of Current Functional Problem -- neck & LUE pain with n/t in ulnar side of L hand s/p ACDF C3-C6 on 8/14/24  -EC       Existing Precautions/Restrictions -- fall;spinal;cervical collar;brace on at all times  -EC       Barriers to Rehab -- none identified  -EC          Living Environment    Current Living Arrangements --  -EC home  -EC       Home Accessibility --  -EC --       People in Home -- spouse  -EC          Home Main Entrance    Number of Stairs, Main Entrance -- one  -EC       Stair Railings, Main Entrance -- none  -EC          Stairs Within Home, Primary    Number of Stairs, Within Home, Primary -- none  -EC          Pain Assessment    Pretreatment Pain Rating -- 5/10  -EC       Posttreatment Pain Rating -- 4/10  -EC       Pain Location -- incisional  -EC       Pain Location - -- back  -EC       Pre/Posttreatment Pain Comment -- & throat  -EC       Pain Intervention(s) -- Medication (See MAR);Repositioned;Ambulation/increased activity  -EC          Cognition    Orientation Status (Cognition) -- oriented x 4  -EC          Range of Motion  Comprehensive    Comment, General Range of Motion --  -EC BUE ROM WFL  -EC          Strength Comprehensive (MMT)    Comment, General Manual Muscle Testing (MMT) Assessment -- B  5/5  -EC          Sensory    Additional Documentation -- Sensory Assessment (Somatosensory) (Group)  -EC          Sensory Assessment (Somatosensory)    Sensory Assessment (Somatosensory) -- UE sensation intact  -EC          Activities of Daily Living    BADL Assessment/Intervention --  -EC upper body dressing  -EC          Upper Body Dressing Assessment/Training    [place] Level (Upper Body Dressing) -- upper body dressing skills;maximum assist (25% patient effort)  -EC       Position (Upper Body Dressing) -- edge of bed sitting  -EC       Comment, (Upper Body Dressing) -- c-collar  -EC          BADL Safety/Performance    Impairments, BADL Safety/Performance -- pain  -EC          Bed Mobility    Bed Mobility -- rolling left;sidelying-sit  -EC       Rolling Right [place] (Bed Mobility) -- verbal cues;contact guard  -EC       Sidelying-Sit [place] (Bed Mobility) -- verbal cues;contact guard  -EC          Functional Mobility    Functional Mobility- Ind. Level -- contact guard assist  -EC       Functional Mobility- Comment -- bed<>[place]  -EC          Transfer Assessment/Treatment    Transfers --  -EC sit-stand transfer;stand-sit transfer  -EC          Sit-Stand Transfer    Sit-Stand [place] (Transfers) -- verbal cues;standby assist  -EC          Stand-Sit Transfer    Stand-Sit [place] (Transfers) -- verbal cues;standby assist  -EC          Safety Issues, Functional Mobility    Impairments Affecting Function (Mobility) -- pain  -EC          Balance    Balance Assessment -- sitting static balance;sitting dynamic balance;standing static balance;standing dynamic balance  -EC       Static Sitting Balance -- independent  -EC       Dynamic Sitting Balance -- standby assist  -EC       Position, Sitting Balance --  unsupported;sitting edge of bed  -EC       Static Standing Balance -- standby assist  -EC       Dynamic Standing Balance -- contact guard  -EC       Position/Device Used, Standing Balance -- supported;unsupported;other (see comments)  HHA initially  -EC          Wound 08/14/24 1222 anterior neck Incision    Wound - Properties Group Placement Date: 08/14/24  -LA Placement Time: 1222  -LA Orientation: anterior  -LA Location: neck  -LA Primary Wound Type: Incision  -LA    Retired Wound - Properties Group Placement Date: 08/14/24  -LA Placement Time: 1222 -LA Orientation: anterior  -LA Location: neck  -LA Primary Wound Type: Incision  -LA    Retired Wound - Properties Group Date first assessed: 08/14/24  -LA Time first assessed: 1222 -LA Location: neck  -LA Primary Wound Type: Incision  -LA       Plan of Care Review    Plan of Care Reviewed With -- patient;spouse  -EC       Progress -- improving  -EC       Outcome Evaluation -- OT eval completed. Pt presents A&Ox4 c/o incisional neck & throat pain. Educated pt on spinal precautions & log rolling, performs bed mobility with CGA. Pt initially dizzy upon sitting up but resided with PLB. Educated pt on c-collar wear. BUE ROM WFL, B  equal & pt reports that she no longer has any numbness or tingling in her LUE. Pt able to perform fxnal mobility with good balance noted. No deficits to warrant OT intervention at this time. Recommend d/c home w assist  -EC          Positioning and Restraints    Pre-Treatment Position --  -EC in bed  -EC       Post Treatment Position -- chair  -EC       In Chair -- reclined;call light within reach;encouraged to call for assist;with family/caregiver;RUE elevated;LUE elevated;with brace  -EC          Therapy Assessment/Plan (OT)    Date of Referral to OT -- 08/14/24  -EC       OT Diagnosis --  -EC --  -EC       Rehab Potential (OT) --  -EC --  -EC       Criteria for Skilled Therapeutic Interventions Met (OT) --  -EC no problems identified  which require skilled intervention  -EC       Therapy Frequency (OT) --  -EC evaluation only  -EC          OT Goals    Dressing Goal Selection (OT) --  -EC dressing, OT goal 1  -EC       Toileting Goal Selection (OT) --  -EC toileting, OT goal 1  -EC       Problem Specific Goal Selection (OT) --  -EC problem specific goal 1, OT  -EC          Problem Specific Goal 1 (OT)    Problem Specific Goal 1 (OT) --  -EC Pt will independently implement 1 pain management technique to decrease pain & improve functional performance.  -EC       Time Frame (Problem Specific Goal 1, OT) --  -EC long term goal (LTG)  -EC       Progress/Outcome (Problem Specific Goal 1, OT) --  -EC new goal  -EC                 User Key  (r) = Recorded By, (t) = Taken By, (c) = Cosigned By      Initials Name Effective Dates    Sangita Ge, CHAGO 06/11/24 -     EC Penny Garsia OTR/L 10/13/23 -                     Occupational Therapy Education       Title: PT OT SLP Therapies (Done)       Topic: Occupational Therapy (Done)       Point: ADL training (Done)       Description:   Instruct learner(s) on proper safety adaptation and remediation techniques during self care or transfers.   Instruct in proper use of assistive devices.                  Learning Progress Summary             Patient Acceptance, E, VU by EC at 8/15/2024 0912   Family Acceptance, E, VU by EC at 8/15/2024 0912                         Point: Home exercise program (Done)       Description:   Instruct learner(s) on appropriate technique for monitoring, assisting and/or progressing therapeutic exercises/activities.                  Learning Progress Summary             Patient Acceptance, E, VU by EC at 8/15/2024 0912   Family Acceptance, E, VU by EC at 8/15/2024 0912                         Point: Precautions (Done)       Description:   Instruct learner(s) on prescribed precautions during self-care and functional transfers.                  Learning Progress Summary              Patient Acceptance, E, VU by  at 8/15/2024 0912   Family Acceptance, E, VU by  at 8/15/2024 0912                         Point: Body mechanics (Done)       Description:   Instruct learner(s) on proper positioning and spine alignment during self-care, functional mobility activities and/or exercises.                  Learning Progress Summary             Patient Acceptance, E, VU by  at 8/15/2024 0912   Family Acceptance, E, VU by  at 8/15/2024 0912                                         User Key       Initials Effective Dates Name Provider Type Discipline     10/13/23 -  Penny Garsia, OTR/L Occupational Therapist OT                    OT Recommendation and Plan  Therapy Frequency (OT): evaluation only  Plan of Care Review  Plan of Care Reviewed With: patient, spouse  Progress: improving  Outcome Evaluation: OT eval completed. Pt presents A&Ox4 c/o incisional neck & throat pain. Educated pt on spinal precautions & log rolling, performs bed mobility with CGA. Pt initially dizzy upon sitting up but resided with PLB. Educated pt on c-collar wear. BUE ROM WFL, B  equal & pt reports that she no longer has any numbness or tingling in her LUE. Pt able to perform fxnal mobility with good balance noted. No deficits to warrant OT intervention at this time. Recommend d/c home w assist  Plan of Care Reviewed With: patient, spouse  Outcome Evaluation: OT eval completed. Pt presents A&Ox4 c/o incisional neck & throat pain. Educated pt on spinal precautions & log rolling, performs bed mobility with CGA. Pt initially dizzy upon sitting up but resided with PLB. Educated pt on c-collar wear. BUE ROM WFL, B  equal & pt reports that she no longer has any numbness or tingling in her LUE. Pt able to perform fxnal mobility with good balance noted. No deficits to warrant OT intervention at this time. Recommend d/c home w assist      OT Rehab Goals       Row Name 08/15/24 0744 08/15/24 0743          Problem Specific  Goal 1 (OT)    Problem Specific Goal 1 (OT) --  -EC Pt will independently implement 1 pain management technique to decrease pain & improve functional performance.  -EC     Time Frame (Problem Specific Goal 1, OT) --  -EC long term goal (LTG)  -EC     Progress/Outcome (Problem Specific Goal 1, OT) --  -EC new goal  -EC               User Key  (r) = Recorded By, (t) = Taken By, (c) = Cosigned By      Initials Name Provider Type Discipline    EC Penny Garsia, OTR/L Occupational Therapist OT                     Outcome Measures       Row Name 08/15/24 0900             How much help from another is currently needed...    Putting on and taking off regular lower body clothing? 4  -EC      Bathing (including washing, rinsing, and drying) 4  -EC      Toileting (which includes using toilet bed pan or urinal) 4  -EC      Putting on and taking off regular upper body clothing 3  -EC      Taking care of personal grooming (such as brushing teeth) 4  -EC      Eating meals 4  -EC      AM-PAC 6 Clicks Score (OT) 23  -EC         Functional Assessment    Outcome Measure Options AM-PAC 6 Clicks Daily Activity (OT)  -EC                User Key  (r) = Recorded By, (t) = Taken By, (c) = Cosigned By      Initials Name Provider Type    EC Penny Garsia OTR/L Occupational Therapist                    Time Calculation:    Time Calculation- OT       Row Name 08/15/24 0808             Time Calculation- OT    OT Start Time 0743  +15 min CR  -EC      OT Stop Time 0807  -EC      OT Time Calculation (min) 24 min  -EC      OT Received On 08/15/24  -EC         Untimed Charges    OT Eval/Re-eval Minutes 39  -EC         Total Minutes    Untimed Charges Total Minutes 39  -EC       Total Minutes 39  -EC                User Key  (r) = Recorded By, (t) = Taken By, (c) = Cosigned By      Initials Name Provider Type    EC Penny Garsia, OTR/L Occupational Therapist                    Therapy Charges for Today       Code Description Service Date Service  Provider Modifiers Qty    94185862029 HC OT EVAL LOW COMPLEXITY 3 8/15/2024 Penny Garsia, ANGELINAR/L GO 1                 OT Discharge Summary  Anticipated Discharge Disposition (OT): home with assist  Reason for Discharge: At baseline function  Outcomes Achieved: Refer to plan of care for updates on goals achieved  Discharge Destination: Home with assist    DOMINIC Dorsey/LEXA  8/15/2024

## 2024-08-15 NOTE — PLAN OF CARE
Goal Outcome Evaluation:  Plan of Care Reviewed With: patient, spouse        Progress: improving  Outcome Evaluation: OT eval completed. Pt presents A&Ox4 c/o incisional neck & throat pain. Educated pt on spinal precautions & log rolling, performs bed mobility with CGA. Pt initially dizzy upon sitting up but resided with PLB. Educated pt on c-collar wear. BUE ROM WFL, B  equal & pt reports that she no longer has any numbness or tingling in her LUE. Pt able to perform fxnal mobility with good balance noted. No deficits to warrant OT intervention at this time. Recommend d/c home w assist      Anticipated Discharge Disposition (OT): home with assist

## 2024-08-15 NOTE — PROGRESS NOTES
"Jessica Allison  64 y.o.      Chief complaint:   Neck pain status post ACDF    Subjective  No events overnight    Temp:  [97.3 °F (36.3 °C)-98.4 °F (36.9 °C)] 98.1 °F (36.7 °C)  Heart Rate:  [60-86] 60  Resp:  [11-18] 16  BP: (129-152)/(70-94) 152/76      Objective:  General Appearance:  Comfortable, well-appearing, in no acute distress and in pain.    Vital signs: (most recent): Blood pressure 152/76, pulse 60, temperature 98.1 °F (36.7 °C), temperature source Oral, resp. rate 16, height 173.8 cm (68.43\"), weight 66.9 kg (147 lb 8 oz), last menstrual period 01/01/2005, SpO2 100%, not currently breastfeeding.  Vital signs are normal.  No fever.    Output: Producing urine.    HEENT: Normal HEENT exam.    Lungs:  Normal effort and normal respiratory rate.  She is not in respiratory distress.    Heart: Normal rate.  Regular rhythm.    Chest: Symmetric chest wall expansion.   Extremities: Normal range of motion.    Skin:  Warm and dry.    Abdomen: Abdomen is soft and non-distended.  Bowel sounds are normal.   There is no abdominal tenderness.     Pulses: Distal pulses are intact.        Neurologic Exam     Mental Status   Oriented to person, place, and time.   Attention: normal. Concentration: normal.   Speech: speech is normal   Level of consciousness: alert  Normal comprehension.     Cranial Nerves     CN II   Visual fields full to confrontation.     CN III, IV, VI   Pupils are equal, round, and reactive to light.  Extraocular motions are normal.     CN V   Facial sensation intact.     CN VII   Facial expression full, symmetric.     CN VIII   CN VIII normal.     CN IX, X   CN IX normal.   CN X normal.     CN XI   CN XI normal.     CN XII   CN XII normal.     Motor Exam   Muscle bulk: normal    Strength   Strength 5/5 throughout.     Sensory Exam   Light touch normal.     Gait, Coordination, and Reflexes     Gait  Gait: normal    Reflexes   Reflexes 2+ except as noted.       Lab Results (last 24 hours)       " Procedure Component Value Units Date/Time    Basic Metabolic Panel [[id number]]  (Abnormal) Collected: 08/15/24 0454    Specimen: Blood Updated: 08/15/24 0550     Glucose 102 mg/dL      BUN 17 mg/dL      Creatinine 0.68 mg/dL      Sodium 136 mmol/L      Potassium 4.0 mmol/L      Chloride 101 mmol/L      CO2 27.0 mmol/L      Calcium 8.8 mg/dL      BUN/Creatinine Ratio 25.0     Anion Gap 8.0 mmol/L      eGFR 96.8 mL/min/1.73     Narrative:      GFR Normal >60  Chronic Kidney Disease <60  Kidney Failure <15      CBC & Differential [[id number]]  (Abnormal) Collected: 08/15/24 0454    Specimen: Blood Updated: 08/15/24 0525    Narrative:      The following orders were created for panel order CBC & Differential.  Procedure                               Abnormality         Status                     ---------                               -----------         ------                     CBC Auto Differential[[id number]]        Abnormal            Final result                 Please view results for these tests on the individual orders.    CBC Auto Differential [[id number]]  (Abnormal) Collected: 08/15/24 0454    Specimen: Blood Updated: 08/15/24 0525     WBC 8.56 10*3/mm3      RBC 3.65 10*6/mm3      Hemoglobin 11.3 g/dL      Hematocrit 34.6 %      MCV 94.8 fL      MCH 31.0 pg      MCHC 32.7 g/dL      RDW 12.1 %      RDW-SD 42.4 fl      MPV 9.6 fL      Platelets 247 10*3/mm3      Neutrophil % 65.8 %      Lymphocyte % 19.4 %      Monocyte % 14.0 %      Eosinophil % 0.0 %      Basophil % 0.4 %      Immature Grans % 0.4 %      Neutrophils, Absolute 5.64 10*3/mm3      Lymphocytes, Absolute 1.66 10*3/mm3      Monocytes, Absolute 1.20 10*3/mm3      Eosinophils, Absolute 0.00 10*3/mm3      Basophils, Absolute 0.03 10*3/mm3      Immature Grans, Absolute 0.03 10*3/mm3      nRBC 0.0 /100 WBC     Tissue Pathology Exam [[id number]] Collected: 08/14/24 2460    Specimen: Disc from Spine, Cervical Updated: 08/14/24 6426                Plan:    Patient doing well.  Patient to work with physical and Occupational Therapy.  If patient has a good day will consider discharge later this evening      Spinal stenosis in cervical region    Degeneration of cervical intervertebral disc    Cervical radiculopathy    Cervical spinal stenosis        Lester Lopez, APRN

## 2024-08-16 NOTE — PAYOR COMM NOTE
"REF:  [id number]     [facility]  FAX   [phone]     Lizzy Allison (64 y.o. Female)       Date of Birth   1960    Social Security Number       Address   [address] [place] [address]    Home Phone   [phone]    MRN   [id number]       Anabaptist   Latter-day    Marital Status                               Admission Date   8/14/24    Admission Type   Elective    Admitting Provider   Adama Alcaraz MD    Attending Provider       Department, Room/Bed   [facility] 3A, 338/1       Discharge Date   8/15/2024    Discharge Disposition   Home or Self Care    Discharge Destination                                 Attending Provider: (none)   Allergies: No Known Allergies    Isolation: None   Infection: None   Code Status: Prior    Ht: 173.8 cm (68.43\")   Wt: 66.9 kg (147 lb 8 oz)    Admission Cmt: None   Principal Problem: Spinal stenosis in cervical region [M48.02]                   Active Insurance as of 8/14/2024       Primary Coverage       Payor Plan Insurance Group Employer/Plan Group    [facility] MEDICARE REPLACEMENT [facility] MED ADV GROUP [id number]       Payor Plan Address Payor Plan Phone Number Payor Plan Fax Number Effective Dates    [address] [address]   7/1/2024 - None Entered    [facility] [id number]         Subscriber Name Subscriber Birth Date Member ID       LIZZY ALLISON 1960 [id number]                     Emergency Contacts        (Rel.) Home Phone Work Phone Mobile Phone    Jacob Allison (Spouse) [phone]                 Discharge Summary        Lester Lopez, APRN at 08/15/24 1623            Date of Discharge:  8/15/2024    Discharge Diagnosis: Degeneration of cervical intervertebral disc [M50.30]  Spinal stenosis in cervical region [M48.02]  Cervical radiculopathy [M54.12]  Cervical spinal stenosis [M48.02]    Presenting Problem/History of Present Illness  Degeneration " of cervical intervertebral disc [M50.30]  Spinal stenosis in cervical region [M48.02]  Cervical radiculopathy [M54.12]  Cervical spinal stenosis [M48.02]       Hospital Course  Patient is a 64 y.o. female presented with Degeneration of cervical intervertebral disc [M50.30]  Spinal stenosis in cervical region [M48.02]  Cervical radiculopathy [M54.12]  Cervical spinal stenosis [M48.02].  The patient has been through all manner of conservative care without any meaningful improvement. The patient went to the operating room on August 14, 2024 for a C 3-4, 4-5, 5-6 ACDF the patient tolerated procedure well.  Currently the patient is ambulating.  The patient is tolerating p.o.  The patient is voiding spontaneously.  It is felt that at this time the patient is stable and suitable to be discharged home.  See orders for discharge instructions and restrictions.  The patient can take the dressing off in 72 hours and can get the wound wet at that time.  The patient is to clean the wound daily with soap and water.  They are to call the office with any drainage from the incision or worsening pain.  She is to wear the cervical collar at all times.  She is not to take any NSAIDs.  I will follow-up with her in the office in 3 weeks.    Procedures Performed  Procedure(s):  CERVICAL DISCECTOMY ANTERIOR WITH FUSION C3-4,4-5,5-6       Consults:   Consults       No orders found for last 30 day(s).              Condition on Discharge: Stable    Vital Signs  Temp:  [97.7 °F (36.5 °C)-98.4 °F (36.9 °C)] 98.4 °F (36.9 °C)  Heart Rate:  [58-81] 62  Resp:  [16-18] 18  BP: (129-152)/(68-89) 134/68    Physical Exam:   Physical Exam  Constitutional:       General: She is awake.      Appearance: She is well-developed.   HENT:      Head: Normocephalic.   Eyes:      Extraocular Movements: Extraocular movements intact.      Pupils: Pupils are equal, round, and reactive to light.   Pulmonary:      Effort: Pulmonary effort is normal.   Musculoskeletal:          General: Normal range of motion.      Cervical back: Normal range of motion.   Skin:     General: Skin is warm.      Comments: Incision clean dry and intact   Neurological:      Mental Status: She is alert and oriented to person, place, and time.      GCS: GCS eye subscore is 4. GCS verbal subscore is 5. GCS motor subscore is 6.      Cranial Nerves: No cranial nerve deficit.      Sensory: No sensory deficit.      Motor: Motor strength is normal.     Gait: Gait normal.      Deep Tendon Reflexes: Reflexes are normal and symmetric.   Psychiatric:         Speech: Speech normal.         Behavior: Behavior normal.         Thought Content: Thought content normal.          Neurological Exam  Mental Status  Awake and alert. Oriented to person, place and time. Oriented to person, place, and time. Speech is normal. Language is fluent with no aphasia. Attention and concentration are normal.    Cranial Nerves  CN I: Sense of smell is normal.  CN II: Right normal visual field. Left normal visual field.  CN III, IV, VI: Extraocular movements intact bilaterally. Pupils equal round and reactive to light bilaterally.  CN V: Facial sensation is normal.  CN VII:  Right: There is no facial weakness.  Left: There is no facial weakness.  CN XI: Shoulder shrug strength is normal.  CN XII: Tongue midline without atrophy or fasciculations.    Motor  Normal muscle bulk throughout. Normal muscle tone. Strength is 5/5 throughout all four extremities.    Sensory  Sensation is intact to light touch, pinprick, vibration and proprioception in all four extremities.    Reflexes  Deep tendon reflexes are 2+ and symmetric in all four extremities.    Gait  Normal casual, toe, heel and tandem gait. Normal gait.         Discharge Disposition  Home or Self Care    Discharge Medications     Discharge Medications        New Medications        Instructions Start Date   cyclobenzaprine 10 MG tablet  Commonly known as: FLEXERIL   10 mg, Oral, 3 Times  Daily PRN      HYDROcodone-acetaminophen 5-325 MG per tablet  Commonly known as: NORCO   1 tablet, Oral, Every 6 Hours PRN             Continue These Medications        Instructions Start Date   atorvastatin 10 MG tablet  Commonly known as: LIPITOR   10 mg, Oral, Daily      CALCIUM-VITAMIN D3 PO   1 tablet, Oral, Daily      levothyroxine 50 MCG tablet  Commonly known as: SYNTHROID, LEVOTHROID   50 mcg, Oral, Daily      losartan 50 MG tablet  Commonly known as: COZAAR   50 mg, Oral, 2 Times Daily      vitamin B-12 100 MCG tablet  Commonly known as: CYANOCOBALAMIN   50 mcg, Oral, Daily             Stop These Medications      diclofenac 75 MG EC tablet  Commonly known as: VOLTAREN              Discharge Diet:   Diet Instructions       Diet: Regular/House Diet; Regular Texture (IDDSI 7); Thin (IDDSI 0)      Discharge Diet: Regular/House Diet    Texture: Regular Texture (IDDSI 7)    Fluid Consistency: Thin (IDDSI 0)            Activity at Discharge:   Activity Instructions       Other Instructions (Specify)      Activity Instructions: No strenuous activity, no driving, wear cervical collar at all times.  No NSAIDs            Follow-up Appointments  Future Appointments   Date Time Provider Department Center   9/4/2024 10:30 AM Leonardo Lopez APRN [facility]   11/5/2024  3:45 PM Adama Alcaraz MD [facility]     Additional Instructions for the Follow-ups that You Need to Schedule       Call MD With Problems / Concerns   As directed      Instructions: Worsening neck or arm pain, drainage from wound, fever, difficulty breathing or swallowing.    Order Comments: Instructions: Worsening neck or arm pain, drainage from wound, fever, difficulty breathing or swallowing.         Discharge Follow-up with Specialty: leonardo lopez np; 3 Weeks   As directed      Specialty: leonardo lopez np   Follow Up: 3 Weeks                Test Results Pending at Discharge       GARRET Martinez  08/15/24  16:23  CDT    Time: Discharge 20 min      Electronically signed by Lester Lopez APRN at 08/15/24 1624       Discharge Order (From admission, onward)       Start     Ordered    08/15/24 1620  Discharge patient  Once        Expected Discharge Date: 08/15/24   Discharge Disposition: Home or Self Care   Physician of Record for Attribution - Please select from Treatment Team: ROHAN BRIAN [[id number]]   Review needed by CMO to determine Physician of Record: No      Question Answer Comment   Physician of Record for Attribution - Please select from Treatment Team ROHAN BRIAN    Review needed by CMO to determine Physician of Record No        08/15/24 1620

## 2024-09-04 ENCOUNTER — HOSPITAL ENCOUNTER (OUTPATIENT)
Dept: GENERAL RADIOLOGY | Facility: HOSPITAL | Age: 64
Discharge: HOME | End: 2024-09-04
Admitting: NURSE PRACTITIONER
Payer: MEDICARE

## 2024-09-04 ENCOUNTER — TELEPHONE (OUTPATIENT)
Dept: NEUROSURGERY | Facility: CLINIC | Age: 64
End: 2024-09-04
Payer: MEDICARE

## 2024-09-04 ENCOUNTER — OFFICE VISIT (OUTPATIENT)
Dept: NEUROSURGERY | Facility: CLINIC | Age: 64
End: 2024-09-04
Payer: MEDICARE

## 2024-09-04 VITALS — BODY MASS INDEX: 22.28 KG/M2 | HEIGHT: 68 IN | WEIGHT: 147 LBS

## 2024-09-04 DIAGNOSIS — M48.02 CERVICAL SPINAL STENOSIS: ICD-10-CM

## 2024-09-04 DIAGNOSIS — Z78.9 NON-SMOKER: ICD-10-CM

## 2024-09-04 DIAGNOSIS — M48.02 SPINAL STENOSIS IN CERVICAL REGION: ICD-10-CM

## 2024-09-04 DIAGNOSIS — M54.12 CERVICAL RADICULOPATHY: ICD-10-CM

## 2024-09-04 DIAGNOSIS — M50.30 DEGENERATION OF CERVICAL INTERVERTEBRAL DISC: Primary | ICD-10-CM

## 2024-09-04 DIAGNOSIS — G56.22 ULNAR NEUROPATHY AT ELBOW OF LEFT UPPER EXTREMITY: ICD-10-CM

## 2024-09-04 DIAGNOSIS — R20.0 LEFT ARM NUMBNESS: ICD-10-CM

## 2024-09-04 PROCEDURE — 72050 X-RAY EXAM NECK SPINE 4/5VWS: CPT

## 2024-09-04 PROCEDURE — 99024 POSTOP FOLLOW-UP VISIT: CPT | Performed by: NURSE PRACTITIONER

## 2024-09-04 PROCEDURE — 1160F RVW MEDS BY RX/DR IN RCRD: CPT | Performed by: NURSE PRACTITIONER

## 2024-09-04 PROCEDURE — 1159F MED LIST DOCD IN RCRD: CPT | Performed by: NURSE PRACTITIONER

## 2024-09-04 NOTE — PATIENT INSTRUCTIONS
Advance Care Planning and Advance Directives     You make decisions on a daily basis - decisions about where you want to live, your career, your home, your life. Perhaps one of the most important decisions you face is your choice for future medical care. Take time to talk with your family and your healthcare team and start planning today.  Advance Care Planning is a process that can help you:  Understand possible future healthcare decisions in light of your own experiences  Reflect on those decision in light of your goals and values  Discuss your decisions with those closest to you and the healthcare professionals that care for you  Make a plan by creating a document that reflects your wishes    Surrogate Decision Maker  In the event of a medical emergency, which has left you unable to communicate or to make your own decisions, you would need someone to make decisions for you.  It is important to discuss your preferences for medical treatment with this person while you are in good health.     Qualities of a surrogate decision maker:  Willing to take on this role and responsibility  Knows what you want for future medical care  Willing to follow your wishes even if they don't agree with them  Able to make difficult medical decisions under stressful circumstances    Advance Directives  These are legal documents you can create that will guide your healthcare team and decision maker(s) when needed. These documents can be stored in the electronic medical record.    Living Will - a legal document to guide your care if you have a terminal condition or a serious illness and are unable to communicate. States vary by statute in document names/types, but most forms may include one or more of the following:        -  Directions regarding life-prolonging treatments        -  Directions regarding artificially provided nutrition/hydration        -  Choosing a healthcare decision maker        -  Direction regarding organ/tissue  donation    Durable Power of  for Healthcare - this document names an -in-fact to make medical decisions for you, but it may also allow this person to make personal and financial decisions for you. Please seek the advice of an  if you need this type of document.    **Advance Directives are not required and no one may discriminate against you if you do not sign one.    Medical Orders  Many states allow specific forms/orders signed by your physician to record your wishes for medical treatment in your current state of health. This form, signed in personal communication with your physician, addresses resuscitation and other medical interventions that you may or may not want.      For more information or to schedule a time with a [facility] Advance Care Planning Facilitator contact: [email or web address] or call [phone] and someone will contact you directly.

## 2024-09-04 NOTE — PROGRESS NOTES
"  Chief complaint:   Chief Complaint   Patient presents with    Neck Pain     Pt is here for 3wk p/o f/u. Pt states since surgery her symptoms have improved.         Subjective     HPI: This is a 64 y.o. female patient who went to the operating room on 8/14/2024 for a Cervical Discectomy Anterior With Fusion C3-4,4-5,5-6. The patient is here in follow up today for postoperative visit.  Prior to started the patient was complaining of neck pain, headaches and left trapezius and scapular pain.  She also did have numbness and tingling in an ulnar distribution from the elbow to the last 2 digits of her left hand.  The patient did have an EMG prior to surgery that was consistent with ulnar neuropathy along with a chronic C8 radiculopathy.  The patient comes in today and says that she has noticed improvement in her neck pain along with her headaches and the pain going into her left trapezius and scapular area.  She does still have the numbness in her left arm from the elbow down to the last 2 digits of her hand.  Denies any right-sided symptoms.  She does state that she is still having some trouble swallowing but does like this is improving as well.  She remains in the cervical collar.  He is not taking any pain medication        Review of Systems   Musculoskeletal:  Positive for arthralgias and myalgias.   Neurological:  Positive for numbness.         Objective      Vital Signs  Ht 173.8 cm (68.43\")   Wt 66.7 kg (147 lb)   LMP 01/01/2005   BMI 22.07 kg/m²     Physical Exam  Constitutional:       General: She is awake.      Appearance: She is well-developed.   HENT:      Head: Normocephalic.   Eyes:      Extraocular Movements: Extraocular movements intact.      Pupils: Pupils are equal, round, and reactive to light.   Pulmonary:      Effort: Pulmonary effort is normal.   Musculoskeletal:         General: Normal range of motion.      Cervical back: Normal range of motion.      Comments: Muscle wasting noted in the left " hand   Skin:     General: Skin is warm.   Neurological:      Mental Status: She is alert and oriented to person, place, and time.      GCS: GCS eye subscore is 4. GCS verbal subscore is 5. GCS motor subscore is 6.      Cranial Nerves: No cranial nerve deficit.      Sensory: No sensory deficit.      Motor: Motor strength is normal.     Gait: Gait normal.      Deep Tendon Reflexes: Reflexes are normal and symmetric.   Psychiatric:         Speech: Speech normal.         Behavior: Behavior normal.         Thought Content: Thought content normal.       Incisions clean dry and intact    Neurological Exam  Mental Status  Awake and alert. Oriented to person, place and time. Oriented to person, place, and time. Speech is normal. Language is fluent with no aphasia. Attention and concentration are normal.    Cranial Nerves  CN I: Sense of smell is normal.  CN II: Right normal visual field. Left normal visual field.  CN III, IV, VI: Extraocular movements intact bilaterally. Pupils equal round and reactive to light bilaterally.  CN V: Facial sensation is normal.  CN VII:  Right: There is no facial weakness.  Left: There is no facial weakness.  CN XI: Shoulder shrug strength is normal.  CN XII: Tongue midline without atrophy or fasciculations.    Motor  Normal muscle bulk throughout. Normal muscle tone. Strength is 5/5 throughout all four extremities.    Sensory  Sensation is intact to light touch, pinprick, vibration and proprioception in all four extremities.    Reflexes  Deep tendon reflexes are 2+ and symmetric in all four extremities.    Gait  Normal casual, toe, heel and tandem gait. Normal gait.      Imaging review: No new imaging          Assessment/Plan: Patient is doing well overall from her neck surgery.  I am going to have her go for x-rays of the cervical spine to see when she can come out of the collar.  I will have her keep her appointment with Dr. Alcaraz.  We did go over activity restrictions after she does come  out of the collar.  She was told to call us if any further problems or concerns.    Patient is a nonsmoker  The patient's Body mass index is 22.07 kg/m².. BMI is within normal parameters. No follow-up required.    Diagnoses and all orders for this visit:    1. Degeneration of cervical intervertebral disc (Primary)  -     XR Spine Cervical Complete 4 or 5 View    2. Spinal stenosis in cervical region  -     XR Spine Cervical Complete 4 or 5 View    3. Cervical radiculopathy  -     XR Spine Cervical Complete 4 or 5 View    4. Cervical spinal stenosis  -     XR Spine Cervical Complete 4 or 5 View    5. Left arm numbness    6. Ulnar neuropathy at elbow of left upper extremity    7. Non-smoker    8. Body mass index (BMI) of 22.0-22.9 in adult        I discussed the patients findings and my recommendations with patient  Lester Lopez, GARRET  09/04/24  11:07 CDT

## 2024-09-04 NOTE — TELEPHONE ENCOUNTER
Placed a call to inform pt of imaging results and pt can come out of brace 09/18/24. Ended call with pt understanding and acknowledgment.

## 2024-09-04 NOTE — TELEPHONE ENCOUNTER
----- Message from Lester Lopez sent at 9/4/2024  1:59 PM CDT -----  X-ray looks good she can come out of the brace on September 18  ----- Message -----  From: Interface, [employer] In  Sent: 9/4/2024   1:07 PM CDT  To: Lester Lopez, GARRET

## 2024-11-05 ENCOUNTER — OFFICE VISIT (OUTPATIENT)
Dept: NEUROSURGERY | Facility: CLINIC | Age: 64
End: 2024-11-05
Payer: MEDICARE

## 2024-11-05 VITALS — BODY MASS INDEX: 21.67 KG/M2 | HEIGHT: 68 IN | WEIGHT: 143 LBS

## 2024-11-05 DIAGNOSIS — M50.30 DEGENERATION OF CERVICAL INTERVERTEBRAL DISC: ICD-10-CM

## 2024-11-05 DIAGNOSIS — M54.12 CERVICAL RADICULOPATHY: ICD-10-CM

## 2024-11-05 DIAGNOSIS — R20.0 LEFT ARM NUMBNESS: ICD-10-CM

## 2024-11-05 DIAGNOSIS — M48.02 SPINAL STENOSIS IN CERVICAL REGION: Primary | ICD-10-CM

## 2024-11-05 DIAGNOSIS — G56.22 ULNAR NEUROPATHY AT ELBOW OF LEFT UPPER EXTREMITY: ICD-10-CM

## 2024-11-05 DIAGNOSIS — Z78.9 NON-SMOKER: ICD-10-CM

## 2024-11-05 PROCEDURE — 1160F RVW MEDS BY RX/DR IN RCRD: CPT | Performed by: NEUROLOGICAL SURGERY

## 2024-11-05 PROCEDURE — 99024 POSTOP FOLLOW-UP VISIT: CPT | Performed by: NEUROLOGICAL SURGERY

## 2024-11-05 PROCEDURE — 1159F MED LIST DOCD IN RCRD: CPT | Performed by: NEUROLOGICAL SURGERY

## 2024-11-05 NOTE — PROGRESS NOTES
SUBJECTIVE:  Patient ID: Jessica Allison is a 64 y.o. female is here today for follow-up.    Chief Complaint:neck pain  Chief Complaint   Patient presents with    Post-op     8/14/24: C3-6 ACDF  Patient states her neck is good but she continues to have LUE numbness/tingling.       HPI  64-year-old female that went to the operating was 14 2024 for a C3-4, 4 5, 5 6 ACDF.  She is done well after that surgery.  She has had a significant improvement in her neck pain and headaches.  She does not have any radicular pain through the shoulder either.  She does get a little bit of pain in the left scapula with a lot of activity but she says it significantly better than it was before surgery and right now she is very satisfied with the results of the neck surgery.  We have also been following her for numbness and tingling in the left hand in an ulnar distribution.  She is very clear that the numbness and tingling is worse with activities.  It is worse with elbow flexion.  And extends from the left medial arm down to the fifth and fourth fingers of the left hand.    The following portions of the patient's history were reviewed and updated as appropriate: allergies, current medications, past family history, past medical history, past social history, past surgical history and problem list.    OBJECTIVE:    Review of Systems   Neurological:  Positive for numbness.   All other systems reviewed and are negative.         Physical Exam  Constitutional:       General: She is awake.   Eyes:      Extraocular Movements: Extraocular movements intact.      Pupils: Pupils are equal, round, and reactive to light.   Neurological:      Motor: Motor strength is normal.     Deep Tendon Reflexes: Reflexes are normal and symmetric.   Psychiatric:         Speech: Speech normal.         Neurological Exam  Mental Status  Awake. Oriented to person, place and time. Speech is normal. Language is fluent with no aphasia. Attention and concentration  are normal.    Cranial Nerves  CN I: Sense of smell is normal.  CN II: Right normal visual field. Left normal visual field.  CN III, IV, VI: Extraocular movements intact bilaterally. Pupils equal round and reactive to light bilaterally.  CN V: Facial sensation is normal.  CN VII:  Right: There is no facial weakness.  Left: There is no facial weakness.  CN XI: Shoulder shrug strength is normal.  CN XII: Tongue midline without atrophy or fasciculations.    Motor  Normal muscle bulk throughout. Normal muscle tone. Strength is 5/5 throughout all four extremities.  Left  4- out of 5 in particular the fourth and fifth fingers.   wasting in the left hand..    Sensory  Sensation is intact to light touch, pinprick, vibration and proprioception in all four extremities.    Reflexes  Deep tendon reflexes are 2+ and symmetric in all four extremities.    Gait  Normal casual, toe, heel and tandem gait.    Worsening numbness and tingling in an ulnar distribution of the left hand with elbow flexion.    Independent Review of Radiographic Studies:   Plain x-rays show good positioning of interbody devices and hardware.    ASSESSMENT/PLAN:  Neck pain.  The patient did well after her ACDF.  I think the symptoms she was having before surgery due to her neck have made significant improvement.  We talked about activity restrictions going forward.  Her questions and concerns were addressed.  Left cubital tunnel syndrome.  The patient I think clinically and electrophysiologically presents with cubital tunnel syndrome.  She does have some leftward foraminal stenosis at C7-T1 on MRI and there is some evidence of a left C8 radiculopathy but clinically she really presents with cubital tunnel syndrome.  Most of her symptoms are not consistent with a C8 radiculopathy.  In order to treat the numbness and tingling in her left upper extremity at this point I would recommend a left ulnar nerve decompression.  The risks and benefits were discussed  at length which included but were not limited to infection, bleeding, damage to the ulnar nerve resulting in permanent pain numbness tingling and weakness in the left hand.      1. Spinal stenosis in cervical region    2. Degeneration of cervical intervertebral disc    3. Cervical radiculopathy    4. Ulnar neuropathy at elbow of left upper extremity    5. Left arm numbness    6. Non-smoker    7. BMI 21.0-21.9, adult        The patient's Body mass index is 21.47 kg/m².. BMI is within normal parameters. No follow-up required.    Return for POSTOPERATIVELY.      Adama Alcaraz MD

## 2024-11-06 PROBLEM — G56.22 ULNAR NEUROPATHY AT ELBOW OF LEFT UPPER EXTREMITY: Status: ACTIVE | Noted: 2024-11-05

## 2024-11-20 ENCOUNTER — PRE-ADMISSION TESTING (OUTPATIENT)
Dept: PREADMISSION TESTING | Facility: HOSPITAL | Age: 64
End: 2024-11-20
Payer: MEDICARE

## 2024-11-20 VITALS
WEIGHT: 141.8 LBS | RESPIRATION RATE: 18 BRPM | BODY MASS INDEX: 21 KG/M2 | OXYGEN SATURATION: 99 % | HEIGHT: 69 IN | SYSTOLIC BLOOD PRESSURE: 133 MMHG | DIASTOLIC BLOOD PRESSURE: 82 MMHG | HEART RATE: 71 BPM

## 2024-11-20 DIAGNOSIS — G56.22 ULNAR NEUROPATHY AT ELBOW OF LEFT UPPER EXTREMITY: ICD-10-CM

## 2024-11-20 LAB
ALBUMIN SERPL-MCNC: 4.6 G/DL (ref 3.5–5.2)
ALBUMIN/GLOB SERPL: 1.8 G/DL
ALP SERPL-CCNC: 88 U/L (ref 39–117)
ALT SERPL W P-5'-P-CCNC: 15 U/L (ref 1–33)
ANION GAP SERPL CALCULATED.3IONS-SCNC: 7 MMOL/L (ref 5–15)
AST SERPL-CCNC: 23 U/L (ref 1–32)
BILIRUB SERPL-MCNC: 1.2 MG/DL (ref 0–1.2)
BILIRUB UR QL STRIP: NEGATIVE
BUN SERPL-MCNC: 16 MG/DL (ref 8–23)
BUN/CREAT SERPL: 23.9 (ref 7–25)
CALCIUM SPEC-SCNC: 10 MG/DL (ref 8.6–10.5)
CHLORIDE SERPL-SCNC: 103 MMOL/L (ref 98–107)
CLARITY UR: CLEAR
CO2 SERPL-SCNC: 29 MMOL/L (ref 22–29)
COLOR UR: YELLOW
CREAT SERPL-MCNC: 0.67 MG/DL (ref 0.57–1)
DEPRECATED RDW RBC AUTO: 43.8 FL (ref 37–54)
EGFRCR SERPLBLD CKD-EPI 2021: 97.1 ML/MIN/1.73
ERYTHROCYTE [DISTWIDTH] IN BLOOD BY AUTOMATED COUNT: 12.9 % (ref 12.3–15.4)
GLOBULIN UR ELPH-MCNC: 2.6 GM/DL
GLUCOSE SERPL-MCNC: 89 MG/DL (ref 65–99)
GLUCOSE UR STRIP-MCNC: NEGATIVE MG/DL
HCT VFR BLD AUTO: 38.3 % (ref 34–46.6)
HGB BLD-MCNC: 12.7 G/DL (ref 12–15.9)
HGB UR QL STRIP.AUTO: NEGATIVE
KETONES UR QL STRIP: NEGATIVE
LEUKOCYTE ESTERASE UR QL STRIP.AUTO: NEGATIVE
MCH RBC QN AUTO: 30.8 PG (ref 26.6–33)
MCHC RBC AUTO-ENTMCNC: 33.2 G/DL (ref 31.5–35.7)
MCV RBC AUTO: 92.7 FL (ref 79–97)
NITRITE UR QL STRIP: NEGATIVE
PH UR STRIP.AUTO: 5.5 [PH] (ref 5–8)
PLATELET # BLD AUTO: 282 10*3/MM3 (ref 140–450)
PMV BLD AUTO: 9.4 FL (ref 6–12)
POTASSIUM SERPL-SCNC: 4 MMOL/L (ref 3.5–5.2)
PROT SERPL-MCNC: 7.2 G/DL (ref 6–8.5)
PROT UR QL STRIP: NEGATIVE
RBC # BLD AUTO: 4.13 10*6/MM3 (ref 3.77–5.28)
SODIUM SERPL-SCNC: 139 MMOL/L (ref 136–145)
SP GR UR STRIP: 1.01 (ref 1–1.03)
UROBILINOGEN UR QL STRIP: NORMAL
WBC NRBC COR # BLD AUTO: 4.47 10*3/MM3 (ref 3.4–10.8)

## 2024-11-20 PROCEDURE — 36415 COLL VENOUS BLD VENIPUNCTURE: CPT

## 2024-11-20 PROCEDURE — 85027 COMPLETE CBC AUTOMATED: CPT

## 2024-11-20 PROCEDURE — 80053 COMPREHEN METABOLIC PANEL: CPT

## 2024-11-20 PROCEDURE — 81003 URINALYSIS AUTO W/O SCOPE: CPT

## 2024-11-20 NOTE — DISCHARGE INSTRUCTIONS

## 2024-12-04 ENCOUNTER — ANESTHESIA (OUTPATIENT)
Dept: PERIOP | Facility: HOSPITAL | Age: 64
End: 2024-12-04
Payer: MEDICARE

## 2024-12-04 ENCOUNTER — ANESTHESIA EVENT (OUTPATIENT)
Dept: PERIOP | Facility: HOSPITAL | Age: 64
End: 2024-12-04
Payer: MEDICARE

## 2024-12-04 ENCOUNTER — HOSPITAL ENCOUNTER (OUTPATIENT)
Facility: HOSPITAL | Age: 64
Setting detail: HOSPITAL OUTPATIENT SURGERY
Discharge: HOME | End: 2024-12-04
Attending: NEUROLOGICAL SURGERY | Admitting: NEUROLOGICAL SURGERY
Payer: MEDICARE

## 2024-12-04 VITALS
SYSTOLIC BLOOD PRESSURE: 137 MMHG | TEMPERATURE: 97.5 F | OXYGEN SATURATION: 98 % | RESPIRATION RATE: 16 BRPM | DIASTOLIC BLOOD PRESSURE: 81 MMHG | HEART RATE: 73 BPM

## 2024-12-04 DIAGNOSIS — G56.22 ULNAR NEUROPATHY AT ELBOW OF LEFT UPPER EXTREMITY: ICD-10-CM

## 2024-12-04 PROCEDURE — 25010000002 DEXAMETHASONE PER 1 MG

## 2024-12-04 PROCEDURE — 25010000002 MIDAZOLAM PER 1MG: Performed by: ANESTHESIOLOGY

## 2024-12-04 PROCEDURE — 64718 REVISE ULNAR NERVE AT ELBOW: CPT | Performed by: NEUROLOGICAL SURGERY

## 2024-12-04 PROCEDURE — 86900 BLOOD TYPING SEROLOGIC ABO: CPT | Performed by: NEUROLOGICAL SURGERY

## 2024-12-04 PROCEDURE — 86850 RBC ANTIBODY SCREEN: CPT | Performed by: NEUROLOGICAL SURGERY

## 2024-12-04 PROCEDURE — 25010000002 PROPOFOL 200 MG/20ML EMULSION

## 2024-12-04 PROCEDURE — 25010000002 FENTANYL CITRATE (PF) 100 MCG/2ML SOLUTION

## 2024-12-04 PROCEDURE — 25010000002 CEFAZOLIN PER 500 MG: Performed by: NEUROLOGICAL SURGERY

## 2024-12-04 PROCEDURE — 25810000003 LACTATED RINGERS PER 1000 ML: Performed by: NEUROLOGICAL SURGERY

## 2024-12-04 PROCEDURE — 25010000002 LIDOCAINE PF 2% 2 % SOLUTION

## 2024-12-04 PROCEDURE — 25010000002 DEXAMETHASONE PER 1 MG: Performed by: ANESTHESIOLOGY

## 2024-12-04 PROCEDURE — 25010000002 ONDANSETRON PER 1 MG

## 2024-12-04 PROCEDURE — 86901 BLOOD TYPING SEROLOGIC RH(D): CPT | Performed by: NEUROLOGICAL SURGERY

## 2024-12-04 RX ORDER — LIDOCAINE HYDROCHLORIDE 10 MG/ML
0.5 INJECTION, SOLUTION EPIDURAL; INFILTRATION; INTRACAUDAL; PERINEURAL ONCE AS NEEDED
Status: DISCONTINUED | OUTPATIENT
Start: 2024-12-04 | End: 2024-12-04 | Stop reason: HOSPADM

## 2024-12-04 RX ORDER — SODIUM CHLORIDE 0.9 % (FLUSH) 0.9 %
3 SYRINGE (ML) INJECTION AS NEEDED
Status: DISCONTINUED | OUTPATIENT
Start: 2024-12-04 | End: 2024-12-04 | Stop reason: HOSPADM

## 2024-12-04 RX ORDER — LABETALOL HYDROCHLORIDE 5 MG/ML
5 INJECTION, SOLUTION INTRAVENOUS
Status: DISCONTINUED | OUTPATIENT
Start: 2024-12-04 | End: 2024-12-04 | Stop reason: HOSPADM

## 2024-12-04 RX ORDER — ONDANSETRON 2 MG/ML
INJECTION INTRAMUSCULAR; INTRAVENOUS AS NEEDED
Status: DISCONTINUED | OUTPATIENT
Start: 2024-12-04 | End: 2024-12-04 | Stop reason: SURG

## 2024-12-04 RX ORDER — MIDAZOLAM HYDROCHLORIDE 2 MG/2ML
2 INJECTION, SOLUTION INTRAMUSCULAR; INTRAVENOUS
Status: DISCONTINUED | OUTPATIENT
Start: 2024-12-04 | End: 2024-12-04 | Stop reason: HOSPADM

## 2024-12-04 RX ORDER — EPHEDRINE SULFATE 50 MG/ML
INJECTION INTRAVENOUS AS NEEDED
Status: DISCONTINUED | OUTPATIENT
Start: 2024-12-04 | End: 2024-12-04 | Stop reason: SURG

## 2024-12-04 RX ORDER — ONDANSETRON 2 MG/ML
4 INJECTION INTRAMUSCULAR; INTRAVENOUS ONCE AS NEEDED
Status: DISCONTINUED | OUTPATIENT
Start: 2024-12-04 | End: 2024-12-04 | Stop reason: HOSPADM

## 2024-12-04 RX ORDER — PROPOFOL 10 MG/ML
INJECTION, EMULSION INTRAVENOUS AS NEEDED
Status: DISCONTINUED | OUTPATIENT
Start: 2024-12-04 | End: 2024-12-04 | Stop reason: SURG

## 2024-12-04 RX ORDER — TRAMADOL HYDROCHLORIDE 50 MG/1
50 TABLET ORAL EVERY 8 HOURS PRN
Qty: 9 TABLET | Refills: 0 | Status: SHIPPED | OUTPATIENT
Start: 2024-12-04

## 2024-12-04 RX ORDER — BUPIVACAINE HYDROCHLORIDE AND EPINEPHRINE 2.5; 5 MG/ML; UG/ML
INJECTION, SOLUTION INFILTRATION; PERINEURAL AS NEEDED
Status: DISCONTINUED | OUTPATIENT
Start: 2024-12-04 | End: 2024-12-04 | Stop reason: HOSPADM

## 2024-12-04 RX ORDER — MAGNESIUM HYDROXIDE 1200 MG/15ML
LIQUID ORAL AS NEEDED
Status: DISCONTINUED | OUTPATIENT
Start: 2024-12-04 | End: 2024-12-04 | Stop reason: HOSPADM

## 2024-12-04 RX ORDER — FLUMAZENIL 0.1 MG/ML
0.2 INJECTION INTRAVENOUS AS NEEDED
Status: DISCONTINUED | OUTPATIENT
Start: 2024-12-04 | End: 2024-12-04 | Stop reason: HOSPADM

## 2024-12-04 RX ORDER — SODIUM CHLORIDE, SODIUM LACTATE, POTASSIUM CHLORIDE, CALCIUM CHLORIDE 600; 310; 30; 20 MG/100ML; MG/100ML; MG/100ML; MG/100ML
1000 INJECTION, SOLUTION INTRAVENOUS CONTINUOUS
Status: DISCONTINUED | OUTPATIENT
Start: 2024-12-04 | End: 2024-12-04 | Stop reason: HOSPADM

## 2024-12-04 RX ORDER — SODIUM CHLORIDE 9 MG/ML
40 INJECTION, SOLUTION INTRAVENOUS AS NEEDED
Status: DISCONTINUED | OUTPATIENT
Start: 2024-12-04 | End: 2024-12-04 | Stop reason: HOSPADM

## 2024-12-04 RX ORDER — SODIUM CHLORIDE 0.9 % (FLUSH) 0.9 %
3 SYRINGE (ML) INJECTION EVERY 12 HOURS SCHEDULED
Status: DISCONTINUED | OUTPATIENT
Start: 2024-12-04 | End: 2024-12-04 | Stop reason: HOSPADM

## 2024-12-04 RX ORDER — LIDOCAINE HYDROCHLORIDE 20 MG/ML
INJECTION, SOLUTION EPIDURAL; INFILTRATION; INTRACAUDAL; PERINEURAL AS NEEDED
Status: DISCONTINUED | OUTPATIENT
Start: 2024-12-04 | End: 2024-12-04 | Stop reason: SURG

## 2024-12-04 RX ORDER — DEXAMETHASONE SODIUM PHOSPHATE 4 MG/ML
INJECTION, SOLUTION INTRA-ARTICULAR; INTRALESIONAL; INTRAMUSCULAR; INTRAVENOUS; SOFT TISSUE AS NEEDED
Status: DISCONTINUED | OUTPATIENT
Start: 2024-12-04 | End: 2024-12-04 | Stop reason: SURG

## 2024-12-04 RX ORDER — DEXAMETHASONE SODIUM PHOSPHATE 4 MG/ML
4 INJECTION, SOLUTION INTRA-ARTICULAR; INTRALESIONAL; INTRAMUSCULAR; INTRAVENOUS; SOFT TISSUE ONCE AS NEEDED
Status: COMPLETED | OUTPATIENT
Start: 2024-12-04 | End: 2024-12-04

## 2024-12-04 RX ORDER — HYDROCODONE BITARTRATE AND ACETAMINOPHEN 5; 325 MG/1; MG/1
1 TABLET ORAL EVERY 4 HOURS PRN
Status: DISCONTINUED | OUTPATIENT
Start: 2024-12-04 | End: 2024-12-04 | Stop reason: HOSPADM

## 2024-12-04 RX ORDER — FENTANYL CITRATE 50 UG/ML
50 INJECTION, SOLUTION INTRAMUSCULAR; INTRAVENOUS
Status: DISCONTINUED | OUTPATIENT
Start: 2024-12-04 | End: 2024-12-04 | Stop reason: HOSPADM

## 2024-12-04 RX ORDER — IBUPROFEN 600 MG/1
600 TABLET, FILM COATED ORAL EVERY 6 HOURS PRN
Status: DISCONTINUED | OUTPATIENT
Start: 2024-12-04 | End: 2024-12-04 | Stop reason: HOSPADM

## 2024-12-04 RX ORDER — MIDAZOLAM HYDROCHLORIDE 2 MG/2ML
0.5 INJECTION, SOLUTION INTRAMUSCULAR; INTRAVENOUS
Status: DISCONTINUED | OUTPATIENT
Start: 2024-12-04 | End: 2024-12-04 | Stop reason: HOSPADM

## 2024-12-04 RX ORDER — HYDROCODONE BITARTRATE AND ACETAMINOPHEN 10; 325 MG/1; MG/1
1 TABLET ORAL EVERY 4 HOURS PRN
Status: DISCONTINUED | OUTPATIENT
Start: 2024-12-04 | End: 2024-12-04 | Stop reason: HOSPADM

## 2024-12-04 RX ORDER — FENTANYL CITRATE 50 UG/ML
INJECTION, SOLUTION INTRAMUSCULAR; INTRAVENOUS AS NEEDED
Status: DISCONTINUED | OUTPATIENT
Start: 2024-12-04 | End: 2024-12-04 | Stop reason: SURG

## 2024-12-04 RX ORDER — SODIUM CHLORIDE, SODIUM LACTATE, POTASSIUM CHLORIDE, CALCIUM CHLORIDE 600; 310; 30; 20 MG/100ML; MG/100ML; MG/100ML; MG/100ML
100 INJECTION, SOLUTION INTRAVENOUS CONTINUOUS
Status: DISCONTINUED | OUTPATIENT
Start: 2024-12-04 | End: 2024-12-04 | Stop reason: HOSPADM

## 2024-12-04 RX ORDER — ACETAMINOPHEN 500 MG
1000 TABLET ORAL ONCE
Status: COMPLETED | OUTPATIENT
Start: 2024-12-04 | End: 2024-12-04

## 2024-12-04 RX ORDER — NALOXONE HCL 0.4 MG/ML
0.4 VIAL (ML) INJECTION AS NEEDED
Status: DISCONTINUED | OUTPATIENT
Start: 2024-12-04 | End: 2024-12-04 | Stop reason: HOSPADM

## 2024-12-04 RX ORDER — SODIUM CHLORIDE 0.9 % (FLUSH) 0.9 %
3-10 SYRINGE (ML) INJECTION AS NEEDED
Status: DISCONTINUED | OUTPATIENT
Start: 2024-12-04 | End: 2024-12-04 | Stop reason: HOSPADM

## 2024-12-04 RX ADMIN — EPHEDRINE SULFATE 5 MG: 50 INJECTION INTRAVENOUS at 14:55

## 2024-12-04 RX ADMIN — DEXAMETHASONE SODIUM PHOSPHATE 4 MG: 4 INJECTION, SOLUTION INTRA-ARTICULAR; INTRALESIONAL; INTRAMUSCULAR; INTRAVENOUS; SOFT TISSUE at 13:58

## 2024-12-04 RX ADMIN — PROPOFOL 150 MG: 10 INJECTION, EMULSION INTRAVENOUS at 14:19

## 2024-12-04 RX ADMIN — ONDANSETRON 4 MG: 2 INJECTION INTRAMUSCULAR; INTRAVENOUS at 14:27

## 2024-12-04 RX ADMIN — FENTANYL CITRATE 50 MCG: 50 INJECTION, SOLUTION INTRAMUSCULAR; INTRAVENOUS at 14:32

## 2024-12-04 RX ADMIN — LIDOCAINE HYDROCHLORIDE 60 MG: 20 INJECTION, SOLUTION EPIDURAL; INFILTRATION; INTRACAUDAL; PERINEURAL at 14:19

## 2024-12-04 RX ADMIN — SODIUM CHLORIDE, POTASSIUM CHLORIDE, SODIUM LACTATE AND CALCIUM CHLORIDE 1000 ML: 600; 310; 30; 20 INJECTION, SOLUTION INTRAVENOUS at 13:19

## 2024-12-04 RX ADMIN — ACETAMINOPHEN 1000 MG: 500 TABLET, FILM COATED ORAL at 13:58

## 2024-12-04 RX ADMIN — CEFAZOLIN 2000 MG: 2 INJECTION, POWDER, FOR SOLUTION INTRAMUSCULAR; INTRAVENOUS at 14:27

## 2024-12-04 RX ADMIN — DEXAMETHASONE SODIUM PHOSPHATE 4 MG: 4 INJECTION, SOLUTION INTRA-ARTICULAR; INTRALESIONAL; INTRAMUSCULAR; INTRAVENOUS; SOFT TISSUE at 14:27

## 2024-12-04 RX ADMIN — MIDAZOLAM HYDROCHLORIDE 2 MG: 1 INJECTION, SOLUTION INTRAMUSCULAR; INTRAVENOUS at 14:04

## 2024-12-04 RX ADMIN — FENTANYL CITRATE 50 MCG: 50 INJECTION, SOLUTION INTRAMUSCULAR; INTRAVENOUS at 14:19

## 2024-12-04 NOTE — OP NOTE
Procedure Note  Preop Diagnosis: Ulnar neuropathy at elbow of left upper extremity [G56.22]    Post-Op Diagnosis Codes:     * Ulnar neuropathy at elbow of left upper extremity [G56.22]     Procedure Name: Cubital Tunnel Release    Indications:  A EMG revealed findings of left cubital tunnel syndrome. The patient now presents for left cubital tunnel release after discussing therapeutic alternatives.          Surgeon: Adama Alcaraz MD     Assistants: none    Anesthesia: General endotracheal anesthesia    ASA Class: 3    Procedure Details   After obtaining informed consent, having the risks and benefits of the procedure explained including but not limited to infection, bleeding, damage to the ulnar or median nerve, permanent pain numbness tingling and paralysis on the hand, stroke, coma, and death.  The patient was brought to the operating.  The patient was given general anesthesia via an endotracheal tube.  The left upper extremity preplan incisions in the palm and left elbow were marked with indelible marker.  The patient was then prepped and draped in a standard sterile fashion.  The left medial portion of the elbow at the medial epicondyle was injected with Marcaine and epinephrine.  A 10 blade scalpel was used to make an incision to the dermis and epidermis.  Bipolar cautery was used for hemostasis.  Tenotomy scissors were then used to dissect through the connective tissues bluntly and sharply until the ulnar nerve was identified in the cubital tunnel.  The ulnar nerve was then decompressed both proximally and distally using tenotomy scissors.  It was circumferentially released from the surrounding connective tissues.  Once we were satisfied that the ulnar nerve had been adequately decompressed the nerve, the wound was copiously irrigated with an antibiotic solution was inspected for hemostasis.  The subcutaneous tissues were closed using a series of inverted interrupted 3-0 Vicryl sutures and the skin was  closed using a running 4-0 Monocryl.  All sponge needle and instrument counts were correct at the end of the procedure.  Patient was extubated in stable condition returned recovery with about 5 mL of blood loss.          Findings:  Cubital tunnel syndrome    Estimated Blood Loss:  minimal           Drains: None           Total IV Fluids: ml           Specimens: None           Implants: * No implants in log *           Complications:  none           Disposition: PACU - hemodynamically stable.           Condition: stable        Adama Alcaraz MD

## 2024-12-04 NOTE — ANESTHESIA POSTPROCEDURE EVALUATION
Patient: Jessica Allison    Procedure Summary       Date: 12/04/24 Room / Location:  [facility] OR  /  [facility] OR    Anesthesia Start: 1415 Anesthesia Stop:     Procedure: ULNAR NERVE DECOMPRESSION LEFT (Left: Elbow) Diagnosis:       Ulnar neuropathy at elbow of left upper extremity      (Ulnar neuropathy at elbow of left upper extremity [G56.22])    Surgeons: Adama Alcaraz MD Provider: Dao Parker CRNA    Anesthesia Type: general ASA Status: 2            Anesthesia Type: general    Vitals  Vitals Value Taken Time   /62 12/04/24 1502   Temp     Pulse 79 12/04/24 1517   Resp     SpO2 100 % 12/04/24 1517   Vitals shown include unfiled device data.        Post Anesthesia Care and Evaluation    Patient location during evaluation: PACU  Patient participation: complete - patient cannot participate  Level of consciousness: responsive to light touch  Pain management: adequate    Airway patency: patent  PONV Status: none  Cardiovascular status: acceptable  Respiratory status: acceptable and oral airway  Hydration status: acceptable

## 2024-12-04 NOTE — ANESTHESIA PREPROCEDURE EVALUATION
Anesthesia Evaluation     Patient summary reviewed   no history of anesthetic complications:   NPO Solid Status: > 8 hours             Airway   Mallampati: II  Neck ROM: limited  Dental      Pulmonary    (-) COPD, asthma, sleep apnea, not a smoker  Cardiovascular   Exercise tolerance: excellent (>7 METS)    (+) hypertension (off blood pressure meds), hyperlipidemia  (-) pacemaker, past MI, angina, cardiac stents      Neuro/Psych  (-) seizures, TIA, CVA  GI/Hepatic/Renal/Endo    (+) thyroid problem hypothyroidism  (-) GERD, liver disease, no renal disease, diabetes    Musculoskeletal     Abdominal    Substance History      OB/GYN          Other                    Anesthesia Plan    ASA 2     general     intravenous induction     Anesthetic plan, risks, benefits, and alternatives have been provided, discussed and informed consent has been obtained with: patient.    CODE STATUS:

## 2024-12-04 NOTE — ANESTHESIA PROCEDURE NOTES
Airway  Urgency: elective    Date/Time: 12/4/2024 2:21 PM  Airway not difficult    General Information and Staff    Patient location during procedure: OR    Indications and Patient Condition  Indications for airway management: airway protection    Preoxygenated: yes  MILS maintained throughout  Mask difficulty assessment: 0 - not attempted    Final Airway Details  Final airway type: supraglottic airway      Successful airway: I-gel  Size 4     Number of attempts at approach: 1  Assessment: lips, teeth, and gum same as pre-op and atraumatic intubation    Additional Comments  Performed by JANICE Hooker

## 2024-12-18 ENCOUNTER — OFFICE VISIT (OUTPATIENT)
Dept: NEUROSURGERY | Facility: CLINIC | Age: 64
End: 2024-12-18
Payer: MEDICARE

## 2024-12-18 VITALS — HEIGHT: 69 IN | BODY MASS INDEX: 20.88 KG/M2 | WEIGHT: 141 LBS

## 2024-12-18 DIAGNOSIS — G56.22 ULNAR NEUROPATHY AT ELBOW OF LEFT UPPER EXTREMITY: Primary | ICD-10-CM

## 2024-12-18 DIAGNOSIS — R29.898 LEFT ARM WEAKNESS: ICD-10-CM

## 2024-12-18 DIAGNOSIS — R20.0 LEFT ARM NUMBNESS: ICD-10-CM

## 2024-12-18 DIAGNOSIS — Z78.9 NONSMOKER: ICD-10-CM

## 2024-12-18 PROCEDURE — 99024 POSTOP FOLLOW-UP VISIT: CPT | Performed by: NURSE PRACTITIONER

## 2024-12-18 PROCEDURE — 1160F RVW MEDS BY RX/DR IN RCRD: CPT | Performed by: NURSE PRACTITIONER

## 2024-12-18 PROCEDURE — 1159F MED LIST DOCD IN RCRD: CPT | Performed by: NURSE PRACTITIONER

## 2024-12-18 NOTE — PATIENT INSTRUCTIONS
Advance Care Planning and Advance Directives     You make decisions on a daily basis - decisions about where you want to live, your career, your home, your life. Perhaps one of the most important decisions you face is your choice for future medical care. Take time to talk with your family and your healthcare team and start planning today.  Advance Care Planning is a process that can help you:  Understand possible future healthcare decisions in light of your own experiences  Reflect on those decision in light of your goals and values  Discuss your decisions with those closest to you and the healthcare professionals that care for you  Make a plan by creating a document that reflects your wishes    Surrogate Decision Maker  In the event of a medical emergency, which has left you unable to communicate or to make your own decisions, you would need someone to make decisions for you.  It is important to discuss your preferences for medical treatment with this person while you are in good health.     Qualities of a surrogate decision maker:  Willing to take on this role and responsibility  Knows what you want for future medical care  Willing to follow your wishes even if they don't agree with them  Able to make difficult medical decisions under stressful circumstances    Advance Directives  These are legal documents you can create that will guide your healthcare team and decision maker(s) when needed. These documents can be stored in the electronic medical record.    Living Will - a legal document to guide your care if you have a terminal condition or a serious illness and are unable to communicate. States vary by statute in document names/types, but most forms may include one or more of the following:        -  Directions regarding life-prolonging treatments        -  Directions regarding artificially provided nutrition/hydration        -  Choosing a healthcare decision maker        -  Direction regarding organ/tissue  donation    Durable Power of  for Healthcare - this document names an -in-fact to make medical decisions for you, but it may also allow this person to make personal and financial decisions for you. Please seek the advice of an  if you need this type of document.    **Advance Directives are not required and no one may discriminate against you if you do not sign one.    Medical Orders  Many states allow specific forms/orders signed by your physician to record your wishes for medical treatment in your current state of health. This form, signed in personal communication with your physician, addresses resuscitation and other medical interventions that you may or may not want.      For more information or to schedule a time with a [facility] Advance Care Planning Facilitator contact: [email or web address] or call [phone] and someone will contact you directly.

## 2024-12-18 NOTE — PROGRESS NOTES
"  Chief complaint:   Chief Complaint   Patient presents with    Neck Pain     Pt is here for 2wk p/o f/u. Pt states since surgery her symptoms have improved.        Subjective     HPI: This is a 64 y.o. female patient who went to the operating room on 12/4/2024 for a Ulnar Nerve Decompression Left - Left. The patient is here in follow up today for postoperative visit.  The patient says that she has slowly been making some improvement with the numbness and tingling in her hand.  She does have some weakness in her hand as well.  She is not complaining of any pain in her arm.  The incision is healing appropriately.  She also does state that she is doing well from her neck surgery as well        Review of Systems   Neurological:  Positive for weakness and numbness.         Objective      Vital Signs  Ht 174 cm (68.5\")   Wt 64 kg (141 lb)   LMP 01/01/2005   BMI 21.13 kg/m²     Physical Exam  Constitutional:       General: She is awake.      Appearance: She is well-developed.   HENT:      Head: Normocephalic.   Eyes:      Extraocular Movements: Extraocular movements intact.      Pupils: Pupils are equal, round, and reactive to light.   Pulmonary:      Effort: Pulmonary effort is normal.   Musculoskeletal:         General: Normal range of motion.      Cervical back: Normal range of motion.   Skin:     General: Skin is warm.   Neurological:      Mental Status: She is alert and oriented to person, place, and time.      GCS: GCS eye subscore is 4. GCS verbal subscore is 5. GCS motor subscore is 6.      Cranial Nerves: No cranial nerve deficit.      Sensory: No sensory deficit.      Motor: Motor strength is normal.     Gait: Gait normal.      Deep Tendon Reflexes: Reflexes are normal and symmetric.   Psychiatric:         Speech: Speech normal.         Behavior: Behavior normal.         Thought Content: Thought content normal.       Incisions clean dry and intact.    Female  strength (pounds)  AGE Right Hand RH Norms " Left Hand LH Norms   20-24  70±14.5  61±13.1   25-29  75±13.9  63.5±12   30-34  79±19.2  68±17.7   35-39  74±10.8  66±11.7   40-44  70±13.5  62±13.8   45-49  62±15.1  56±12.7   50-54  66±11.6  57±10.7   55-59  57±12.5  47±11.9   60-64  55±10.1  46±10.1   65-69    55 lbs * 50±9.7      28 lbs > 41±8.2   70-74  50±11.7  42±10.2   75+  43±11.0  38±8.9   (BIANCA De Oliveira et al; Hand Dynometer: Effects of trials and sessions.  Perpetual and Motor Skills 61:195-8, 1985)  * = Dominant hand  > = Intervention            Neurological Exam  Mental Status  Awake and alert. Oriented to person, place and time. Oriented to person, place, and time. Speech is normal. Language is fluent with no aphasia. Attention and concentration are normal.    Cranial Nerves  CN I: Sense of smell is normal.  CN II: Right normal visual field. Left normal visual field.  CN III, IV, VI: Extraocular movements intact bilaterally. Pupils equal round and reactive to light bilaterally.  CN V: Facial sensation is normal.  CN VII:  Right: There is no facial weakness.  Left: There is no facial weakness.  CN XI: Shoulder shrug strength is normal.  CN XII: Tongue midline without atrophy or fasciculations.    Motor  Normal muscle bulk throughout. Normal muscle tone. Strength is 5/5 throughout all four extremities.    Sensory  Sensation is intact to light touch, pinprick, vibration and proprioception in all four extremities.    Reflexes  Deep tendon reflexes are 2+ and symmetric in all four extremities.    Gait  Normal casual, toe, heel and tandem gait. Normal gait.      Imaging review: No new imaging          Assessment/Plan: Patient overall is making improvements but she does have weakness in her hand as well as continued numbness but it is improving.  We will make a referral to physical and occupational therapy at [facility] for her to undergo rehab.  I will also give her an order for pracasil for scarring.  We will have her keep her appointment with Dr. Alcaraz.   She was told to call us if any further problems or concerns.    Patient is a nonsmoker  The patient's Body mass index is 21.13 kg/m².. BMI is within normal parameters. No follow-up required.    Diagnoses and all orders for this visit:    1. Ulnar neuropathy at elbow of left upper extremity (Primary)  -     Ambulatory Referral to Physical Therapy for Evaluation & Treatment  -     Ambulatory Referral to Occupational Therapy for Evaluation & Treatment    2. Left arm numbness  -     Ambulatory Referral to Physical Therapy for Evaluation & Treatment  -     Ambulatory Referral to Occupational Therapy for Evaluation & Treatment    3. Left arm weakness  -     Ambulatory Referral to Physical Therapy for Evaluation & Treatment  -     Ambulatory Referral to Occupational Therapy for Evaluation & Treatment    4. Nonsmoker        I discussed the patients findings and my recommendations with patient  Lester Lopez, APRN  12/18/24  11:46 CST  Answers submitted by the patient for this visit:  Post Operative Visit (Submitted on 12/11/2024)  Chief Complaint: Follow-up  Pain Control: no pain  Fever: no fever  Diet: adequate intake  Activity: returning to normal  Operative Site Issues: No

## 2025-02-05 ENCOUNTER — TELEPHONE (OUTPATIENT)
Dept: NEUROSURGERY | Facility: CLINIC | Age: 65
End: 2025-02-05
Payer: MEDICARE

## 2025-02-05 NOTE — TELEPHONE ENCOUNTER
Called to confirm patient's appt with Dr Brian on 2/13/25 @ 12:30 pm  - no answer so left a message to call me back      RHONDA ROLLE [facility]  CLINICAL COORDINATOR  DR ROHAN BRIAN  [facility] NEUROSURGERY        IT IS OKAY FOR THE HUB TO DELIVER THIS INFORMATION TO THE PATIENT IF THEY RECEIVE THIS CALL BACK

## 2025-02-05 NOTE — TELEPHONE ENCOUNTER
Patient called back and has confirmed appt with Dr Alcaraz.      RHONDA ROLLE [facility]  CLINICAL COORDINATOR  [facility] NEUROSURGERY

## 2025-02-13 ENCOUNTER — OFFICE VISIT (OUTPATIENT)
Dept: NEUROSURGERY | Facility: CLINIC | Age: 65
End: 2025-02-13
Payer: MEDICARE

## 2025-02-13 VITALS — HEIGHT: 69 IN | WEIGHT: 141 LBS | BODY MASS INDEX: 20.88 KG/M2

## 2025-02-13 DIAGNOSIS — R29.898 LEFT ARM WEAKNESS: ICD-10-CM

## 2025-02-13 DIAGNOSIS — G56.22 ULNAR NEUROPATHY AT ELBOW OF LEFT UPPER EXTREMITY: Primary | ICD-10-CM

## 2025-02-13 DIAGNOSIS — R20.0 LEFT ARM NUMBNESS: ICD-10-CM

## 2025-02-13 DIAGNOSIS — Z78.9 NONSMOKER: ICD-10-CM

## 2025-02-13 PROCEDURE — 1159F MED LIST DOCD IN RCRD: CPT | Performed by: NEUROLOGICAL SURGERY

## 2025-02-13 PROCEDURE — 99024 POSTOP FOLLOW-UP VISIT: CPT | Performed by: NEUROLOGICAL SURGERY

## 2025-02-13 PROCEDURE — 1160F RVW MEDS BY RX/DR IN RCRD: CPT | Performed by: NEUROLOGICAL SURGERY

## 2025-02-13 NOTE — PROGRESS NOTES
SUBJECTIVE:  Patient ID: Jessica Allison is a 64 y.o. female is here today for follow-up.    Chief Complaint: Ulnar neuropathy  Chief Complaint   Patient presents with    Post-op     Patient is here for follow up symptom check after LT ulnar nerve decompression on 12/4/2024.   She completed 8 therapy sessions @ [facility] with 90% improvement.  Patient says the strength is much improved after therapy.       HPI  64-year-old female that went to the operating room on August 14, 2024 for a three-level ACDF.  She then went to the operating room December 4, 2024 for a left ulnar nerve release.  Regarding her neck her neck pain and headaches have all remarkably improved.  She does not have any upper extremity radicular pain numbness or tingling.  Regarding her left upper extremity she says the tingling and numbness in the left hand is improving and she only occasionally notices it.  She did a dedicated course of physical therapy for the weakness in her left hand and the wasting in his completed that.  She says it was very helpful and she felt that her strength significantly improved.    The following portions of the patient's history were reviewed and updated as appropriate: allergies, current medications, past family history, past medical history, past social history, past surgical history and problem list.    OBJECTIVE:    Review of Systems   All other systems reviewed and are negative.         Physical Exam  Constitutional:       General: She is awake.   Eyes:      Extraocular Movements: Extraocular movements intact.      Pupils: Pupils are equal, round, and reactive to light.   Neurological:      Motor: Motor strength is normal.     Deep Tendon Reflexes: Reflexes are normal and symmetric.   Psychiatric:         Speech: Speech normal.         Neurological Exam  Mental Status  Awake. Oriented to person, place and time. Speech is normal. Language is fluent with no aphasia. Attention and concentration are  normal.    Cranial Nerves  CN I: Sense of smell is normal.  CN II: Right normal visual field. Left normal visual field.  CN III, IV, VI: Extraocular movements intact bilaterally. Pupils equal round and reactive to light bilaterally.  CN V: Facial sensation is normal.  CN VII:  Right: There is no facial weakness.  Left: There is no facial weakness.  CN XI: Shoulder shrug strength is normal.  CN XII: Tongue midline without atrophy or fasciculations.    Motor  Normal muscle bulk throughout. Normal muscle tone. Strength is 5/5 throughout all four extremities.  Strength in the right hand  measured at 60 pounds    Strength in the left hand  measured at 48 pounds, some wasting in the left hand in an ulnar distribution..    Sensory  Sensation is intact to light touch, pinprick, vibration and proprioception in all four extremities.    Reflexes  Deep tendon reflexes are 2+ and symmetric in all four extremities.    Gait  Normal casual, toe, heel and tandem gait.      Independent Review of Radiographic Studies:       ASSESSMENT/PLAN:  Cervicalgia.  Patient is doing well after her neck surgery.  We talked about activity restrictions going forward.  Ulnar neuropathy.  The patient is doing well.  Her strength is already improved with therapy in the left hand from 28 pounds of strength to 48 pounds of strength.  And her pain and sensory symptoms continue to get better.  We talked about continuing home exercises.  Will see her in follow-up in 3 months.      1. Ulnar neuropathy at elbow of left upper extremity    2. Left arm numbness    3. Left arm weakness    4. Nonsmoker    5. BMI 21.0-21.9, adult        The patient's Body mass index is 21.13 kg/m².. BMI is within normal parameters. No follow-up required.    Return in about 3 months (around 5/13/2025) for SYMPTOMS RECHECK (15 MINS) - DILEEP Alcaraz MD

## 2025-05-13 ENCOUNTER — OFFICE VISIT (OUTPATIENT)
Dept: NEUROSURGERY | Facility: CLINIC | Age: 65
End: 2025-05-13
Payer: MEDICARE

## 2025-05-13 VITALS — WEIGHT: 142 LBS | BODY MASS INDEX: 21.03 KG/M2 | HEIGHT: 69 IN

## 2025-05-13 DIAGNOSIS — R29.898 LEFT ARM WEAKNESS: ICD-10-CM

## 2025-05-13 DIAGNOSIS — Z78.9 NONSMOKER: ICD-10-CM

## 2025-05-13 DIAGNOSIS — G56.22 ULNAR NEUROPATHY AT ELBOW OF LEFT UPPER EXTREMITY: Primary | ICD-10-CM

## 2025-05-13 DIAGNOSIS — R20.0 LEFT ARM NUMBNESS: ICD-10-CM

## 2025-05-13 PROCEDURE — 99212 OFFICE O/P EST SF 10 MIN: CPT | Performed by: NURSE PRACTITIONER

## 2025-05-13 PROCEDURE — 1159F MED LIST DOCD IN RCRD: CPT | Performed by: NURSE PRACTITIONER

## 2025-05-13 PROCEDURE — 1160F RVW MEDS BY RX/DR IN RCRD: CPT | Performed by: NURSE PRACTITIONER

## 2025-05-13 NOTE — PROGRESS NOTES
"    Chief complaint:   Chief Complaint   Patient presents with    Arm Pain     Pt is here for 3mo f/u symptom check. Pt states since her last office visit her symptoms have improved.         Subjective     HPI: This is a 64-year-old female patient who went to the operating room on August 14, 2024 for 3 level ACDF and then on December 4, 2024 for a left cubital tunnel release.  Patient was last seen in February 2025 and at that time she did have improvement in her neck pain and headaches.  She was not complaining of any upper extremity numbness or tingling.  The patient did go through physical therapy after the cubital tunnel release and folic this was helpful in recovering strength.  The patient says overall she is still doing well.  She is not complaining any neck pain or pain radiating into her arms.  Denies any numbness or tingling in her arm.  She does feel like she is doing well and still making improvements with her strength overall.    Review of Systems   Neurological:  Positive for weakness.   All other systems reviewed and are negative.        Objective      Vital Signs  Ht 174 cm (68.5\")   Wt 64.4 kg (142 lb)   LMP 01/01/2005   BMI 21.28 kg/m²     Physical Exam  Constitutional:       General: She is awake.      Appearance: She is well-developed.   HENT:      Head: Normocephalic.   Eyes:      Extraocular Movements: Extraocular movements intact.      Pupils: Pupils are equal, round, and reactive to light.   Pulmonary:      Effort: Pulmonary effort is normal.   Musculoskeletal:         General: Normal range of motion.      Cervical back: Normal range of motion.   Skin:     General: Skin is warm.   Neurological:      Mental Status: She is alert and oriented to person, place, and time.      GCS: GCS eye subscore is 4. GCS verbal subscore is 5. GCS motor subscore is 6.      Cranial Nerves: No cranial nerve deficit.      Sensory: No sensory deficit.      Motor: Motor strength is normal.Weakness present.      " Gait: Gait normal.      Deep Tendon Reflexes: Reflexes are normal and symmetric.   Psychiatric:         Speech: Speech normal.         Behavior: Behavior normal.         Thought Content: Thought content normal.       Female  strength (pounds)  AGE Right Hand RH Norms Left Hand LH Norms   20-24  70±14.5  61±13.1   25-29  75±13.9  63.5±12   30-34  79±19.2  68±17.7   35-39  74±10.8  66±11.7   40-44  70±13.5  62±13.8   45-49  62±15.1  56±12.7   50-54  66±11.6  57±10.7   55-59  57±12.5  47±11.9   60-64  55±10.1  46±10.1   65-69        56 lbs 50±9.7        38 lbs 41±8.2   70-74  50±11.7  42±10.2   75+  43±11.0  38±8.9   (BIANCA De Oliveira et al; Hand Dynometer: Effects of trials and sessions.  Perpetual and Motor Skills 61:195-8, 1985)  * = Dominant hand  > = Intervention          Neurological Exam  Mental Status  Awake and alert. Oriented to person, place and time. Oriented to person, place, and time. Speech is normal. Language is fluent with no aphasia. Attention and concentration are normal.    Cranial Nerves  CN I: Sense of smell is normal.  CN II: Right normal visual field. Left normal visual field.  CN III, IV, VI: Extraocular movements intact bilaterally. Pupils equal round and reactive to light bilaterally.  CN V: Facial sensation is normal.  CN VII:  Right: There is no facial weakness.  Left: There is no facial weakness.  CN XI: Shoulder shrug strength is normal.  CN XII: Tongue midline without atrophy or fasciculations.    Motor  Normal muscle bulk throughout. Normal muscle tone. Strength is 5/5 throughout all four extremities.    Sensory  Sensation is intact to light touch, pinprick, vibration and proprioception in all four extremities.    Reflexes  Deep tendon reflexes are 2+ and symmetric in all four extremities.    Gait  Normal casual, toe, heel and tandem gait. Normal gait.      Imaging review: No new image        Assessment/Plan: Patient is doing well from her surgery in her neck and in her arm.  At this time we  will only see her on an as-needed basis.  She was told to call us if she and further problems or concerns    Patient is a nonsmoker  The patient's Body mass index is 21.28 kg/m².. BMI is within normal parameters. No follow-up required.  Advance Care Planning   ACP discussion was held with the patient during this visit. Patient does not have an advance directive, information provided.   STEADI Fall Risk Assessment was completed, and patient is at LOW risk for falls.Assessment completed on:5/13/2025     Diagnoses and all orders for this visit:    1. Ulnar neuropathy at elbow of left upper extremity (Primary)    2. Left arm numbness    3. Left arm weakness    4. Nonsmoker    5. BMI 21.0-21.9, adult        I discussed the patients findings and my recommendations with patient  Lester Lopez, APRN  05/13/25  11:37 CDT

## 2025-05-13 NOTE — PATIENT INSTRUCTIONS
Advance Care Planning and Advance Directives     You make decisions on a daily basis - decisions about where you want to live, your career, your home, your life. Perhaps one of the most important decisions you face is your choice for future medical care. Take time to talk with your family and your healthcare team and start planning today.  Advance Care Planning is a process that can help you:  Understand possible future healthcare decisions in light of your own experiences  Reflect on those decision in light of your goals and values  Discuss your decisions with those closest to you and the healthcare professionals that care for you  Make a plan by creating a document that reflects your wishes    Surrogate Decision Maker  In the event of a medical emergency, which has left you unable to communicate or to make your own decisions, you would need someone to make decisions for you.  It is important to discuss your preferences for medical treatment with this person while you are in good health.     Qualities of a surrogate decision maker:  Willing to take on this role and responsibility  Knows what you want for future medical care  Willing to follow your wishes even if they don't agree with them  Able to make difficult medical decisions under stressful circumstances    Advance Directives  These are legal documents you can create that will guide your healthcare team and decision maker(s) when needed. These documents can be stored in the electronic medical record.    Living Will - a legal document to guide your care if you have a terminal condition or a serious illness and are unable to communicate. States vary by statute in document names/types, but most forms may include one or more of the following:        -  Directions regarding life-prolonging treatments        -  Directions regarding artificially provided nutrition/hydration        -  Choosing a healthcare decision maker        -  Direction regarding organ/tissue  donation    Durable Power of  for Healthcare - this document names an -in-fact to make medical decisions for you, but it may also allow this person to make personal and financial decisions for you. Please seek the advice of an  if you need this type of document.    **Advance Directives are not required and no one may discriminate against you if you do not sign one.    Medical Orders  Many states allow specific forms/orders signed by your physician to record your wishes for medical treatment in your current state of health. This form, signed in personal communication with your physician, addresses resuscitation and other medical interventions that you may or may not want.      For more information or to schedule a time with a [facility] Advance Care Planning Facilitator contact: [email or web address] or call [phone] and someone will contact you directly.

## (undated) DEVICE — TOTAL TRAY, 16FR 10ML SIL FOLEY, URN: Brand: MEDLINE

## (undated) DEVICE — VAGINAL PREP TRAY: Brand: MEDLINE INDUSTRIES, INC.

## (undated) DEVICE — THE STERILE LIGHT HANDLE COVER IS USED WITH STERIS SURGICAL LIGHTING AND VISUALIZATION SYSTEMS.

## (undated) DEVICE — ANTIBACTERIAL UNDYED BRAIDED (POLYGLACTIN 910), SYNTHETIC ABSORBABLE SUTURE: Brand: COATED VICRYL

## (undated) DEVICE — PACK,UNIVERSAL,NO GOWNS: Brand: MEDLINE

## (undated) DEVICE — SPNG GZ WOVN 4X4IN 12PLY 10/BX STRL

## (undated) DEVICE — DRP C/ARMOR

## (undated) DEVICE — TRAP FLD MINIVAC MEGADYNE 100ML

## (undated) DEVICE — CANN SMPL SOFTECH BIFLO ETCO2 A/M 7FT

## (undated) DEVICE — PK EXTRM 30

## (undated) DEVICE — GLV SURG DERMASSURE GRN LF PF 8.0

## (undated) DEVICE — GLV SURG BIOGEL LTX PF 6 1/2

## (undated) DEVICE — SPONGE,DISSECTOR,K,XRAY,9/16"X1/4",STRL: Brand: MEDLINE

## (undated) DEVICE — HALTR TRACT HD CERV STD UNIV

## (undated) DEVICE — DISPOSABLE IRRIGATION CASSETTE: Brand: CORE

## (undated) DEVICE — UTILITY MARKER W/MED LABELS: Brand: MEDLINE

## (undated) DEVICE — PK SPINE CERV ANT 30

## (undated) DEVICE — PROXIMATE RH ROTATING HEAD SKIN STAPLERS (35 WIDE) CONTAINS 35 STAINLESS STEEL STAPLES: Brand: PROXIMATE

## (undated) DEVICE — EMG TUBE 8229707 NIM TRIVANTAGE 7.0MM ID: Brand: NIM TRIVANTAGE™

## (undated) DEVICE — DISPOSABLE IRRIGATION BIPOLAR CORD, M1000 TYPE: Brand: KIRWAN

## (undated) DEVICE — SUT MNCRYL 4/0 PS2 27IN UD MCP426H

## (undated) DEVICE — BANDAGE,GAUZE,BULKEE II,4.5"X4.1YD,STRL: Brand: MEDLINE

## (undated) DEVICE — GLV SURG BIOGEL LTX PF 8

## (undated) DEVICE — CONN FLX BREATHE CIRCT

## (undated) DEVICE — PAD,NON-ADHERENT,3X8,STERILE,LF,1/PK: Brand: MEDLINE

## (undated) DEVICE — SPONGE,DISSECTOR,ROUND CHERRY,XR,ST,5/PK: Brand: MEDLINE

## (undated) DEVICE — LIMB HOLDER, WRIST/ANKLE: Brand: DEROYAL

## (undated) DEVICE — ELECTRD BLD EZ CLN MOD XLNG 2.75IN

## (undated) DEVICE — LP VESL MAXI 2.5X1MM RED 2PK

## (undated) DEVICE — PROTECTOR ULNA NERV

## (undated) DEVICE — THE STERILE THE STERIS STERILE CAMERA HANDLE COVERS ARE DESIGNED FOR HARMONYAIR 4K CAMERA MODULE, AND PROVIDE STERILE CONTROL THAT ALLOW FOR INCREASING AND DECREASING ILLUMINATION THROUGH SEVEN INTENSITY LEVELS.

## (undated) DEVICE — PROXIMATE RH ROTATING HEAD SKIN STAPLERS (35 REGULAR) CONTAINS 35 STAINLESS STEEL STAPLES: Brand: PROXIMATE

## (undated) DEVICE — GLV SURG DERMASSURE GRN LF PF 7.0

## (undated) DEVICE — SURGICAL SUCTION CONNECTING TUBE WITH MALE CONNECTOR AND SUCTION CLAMP, 2 FT. LONG (.6 M), 5 MM I.D.: Brand: CONMED

## (undated) DEVICE — SUT NUROLON 3/0 RB1 CR8 18IN C553D

## (undated) DEVICE — CLTH CLENS READYCLEANSE PERI CARE PK/5

## (undated) DEVICE — ARM SLING II: Brand: DEROYAL

## (undated) DEVICE — NEEDLE, QUINCKE, 18GX3.5": Brand: MEDLINE

## (undated) DEVICE — SYR CONTRL LUERLOK 10CC

## (undated) DEVICE — PIN DISTRACT TI 14MM STRL

## (undated) DEVICE — NEEDLE,22GX1.5",REG,BEVEL: Brand: MEDLINE

## (undated) DEVICE — 5.0MM BARREL STRAIGHT FLUTE